# Patient Record
Sex: FEMALE | Race: WHITE | NOT HISPANIC OR LATINO | Employment: FULL TIME | URBAN - METROPOLITAN AREA
[De-identification: names, ages, dates, MRNs, and addresses within clinical notes are randomized per-mention and may not be internally consistent; named-entity substitution may affect disease eponyms.]

---

## 2020-02-06 ENCOUNTER — TRANSCRIBE ORDERS (OUTPATIENT)
Dept: ADMINISTRATIVE | Facility: HOSPITAL | Age: 60
End: 2020-02-06

## 2020-02-06 DIAGNOSIS — Z12.39 SCREENING BREAST EXAMINATION: Primary | ICD-10-CM

## 2020-02-06 DIAGNOSIS — R10.33 PERIUMBILICAL ABDOMINAL PAIN: ICD-10-CM

## 2020-02-12 ENCOUNTER — HOSPITAL ENCOUNTER (OUTPATIENT)
Dept: CT IMAGING | Facility: HOSPITAL | Age: 60
Discharge: HOME | End: 2020-02-12

## 2020-02-12 ENCOUNTER — HOSPITAL ENCOUNTER (OUTPATIENT)
Dept: MAMMOGRAPHY | Facility: HOSPITAL | Age: 60
Discharge: HOME | End: 2020-02-12
Admitting: FAMILY MEDICINE

## 2020-02-12 DIAGNOSIS — R10.33 PERIUMBILICAL ABDOMINAL PAIN: ICD-10-CM

## 2020-02-12 DIAGNOSIS — Z12.39 SCREENING BREAST EXAMINATION: ICD-10-CM

## 2020-02-12 LAB — CREAT BLDA-MCNC: 0.7 MG/DL (ref 0.6–1.3)

## 2020-02-12 PROCEDURE — 82565 ASSAY OF CREATININE: CPT

## 2020-02-12 PROCEDURE — 77067 SCR MAMMO BI INCL CAD: CPT

## 2020-02-12 PROCEDURE — 0 IOPAMIDOL PER 1 ML: Performed by: FAMILY MEDICINE

## 2020-02-12 PROCEDURE — 77063 BREAST TOMOSYNTHESIS BI: CPT

## 2020-02-12 PROCEDURE — 74177 CT ABD & PELVIS W/CONTRAST: CPT

## 2020-02-12 RX ADMIN — IOPAMIDOL 100 ML: 755 INJECTION, SOLUTION INTRAVENOUS at 10:45

## 2020-02-21 ENCOUNTER — OFFICE VISIT (OUTPATIENT)
Dept: SURGERY | Facility: CLINIC | Age: 60
End: 2020-02-21

## 2020-02-21 VITALS
OXYGEN SATURATION: 100 % | HEIGHT: 63 IN | WEIGHT: 259 LBS | SYSTOLIC BLOOD PRESSURE: 122 MMHG | TEMPERATURE: 97 F | HEART RATE: 81 BPM | BODY MASS INDEX: 45.89 KG/M2 | DIASTOLIC BLOOD PRESSURE: 73 MMHG

## 2020-02-21 DIAGNOSIS — K42.9 UMBILICAL HERNIA WITHOUT OBSTRUCTION AND WITHOUT GANGRENE: Primary | ICD-10-CM

## 2020-02-21 PROBLEM — K43.9 HERNIA OF ABDOMINAL WALL: Status: ACTIVE | Noted: 2020-02-21

## 2020-02-21 PROCEDURE — 99203 OFFICE O/P NEW LOW 30 MIN: CPT | Performed by: SURGERY

## 2020-02-21 NOTE — PROGRESS NOTES
"Alayna Garcia is a 59 y.o. female.   Chief Complaint   Patient presents with   • New patient     Periumbilical hernia      /73   Pulse 81   Temp 97 °F (36.1 °C) (Oral)   Ht 160 cm (63\")   Wt 117 kg (259 lb)   SpO2 100%   BMI 45.88 kg/m²     HISTORY OF PRESENT ILLNESS:  59-year-old lady referred to me for evaluation of abdominal pain.  She says that it began about 1 year ago when she was using a weight machine to do crunches.  She had supraumbilical pain that feels like twisting.  It is worse when she bends.  It is sharp it is not associate with any significant nausea vomiting or bowel changes.  The discomfort lasts only a few minutes.  In order to evaluate this she underwent a CT scan which showed a small periumbilical hernia containing fat.      No outpatient encounter medications on file as of 2/21/2020.     No facility-administered encounter medications on file as of 2/21/2020.          The following portions of the patient's history were reviewed and updated as appropriate: allergies, current medications, past family history, past medical history, past social history, past surgical history and problem list.    Review of Systems  A complete review of systems been obtained is positive for varicose veins and hot flashes the remainder of the review of systems is negative  Objective   Physical Exam   Constitutional: She appears well-developed and well-nourished.   HENT:   Head: Normocephalic and atraumatic.   Eyes: Conjunctivae are normal. No scleral icterus.   Neck: No tracheal deviation present.   Cardiovascular: Normal rate and intact distal pulses.   Pulmonary/Chest: Effort normal. No respiratory distress.   Abdominal: Soft. She exhibits no distension.   Tender to palpation in the upper midline where there is a rectus diastases but no significant hernia.  She is not tender to palpation at her umbilical hernia.   Lymphadenopathy:     She has no cervical adenopathy.   Neurological: She is " alert. No cranial nerve deficit.   Skin: Skin is warm and dry.   Psychiatric: She has a normal mood and affect. Her behavior is normal.         Assessment/Plan   Kelly was seen today for new patient.    Diagnoses and all orders for this visit:    Umbilical hernia without obstruction and without gangrene    The patient is symptomatic in the upper midline which I do not see any evidence of ventral hernia.  It sounds like hernia type pain but whenever her umbilical hernia is palpated it does not reproduce this discomfort.  She does have a BMI that is 46 which is above my ceiling for typically offering elective hernia repairs and so I have counseled her about the difference between the umbilical hernia and the rectus diastases and that although it is possible the umbilical hernia is causing her pain it may not be.  Because of her weight I have encouraged her to try to lose a little bit more weight in order to try to help reduce the risk if she is good to have an umbilical hernia repair performed.  At this time I will have her follow-up with me in about 4 months to reevaluate symptoms to reevaluate the abdominal wall physical exam findings and to check her weight loss status.    Nikolas Garza MD  2/21/2020  2:14 PM    This note was created using Dragon Voice Recognition software.

## 2020-02-25 ENCOUNTER — HOSPITAL ENCOUNTER (OUTPATIENT)
Facility: HOSPITAL | Age: 60
Setting detail: HOSPITAL OUTPATIENT SURGERY
End: 2020-02-25
Attending: INTERNAL MEDICINE | Admitting: INTERNAL MEDICINE

## 2020-04-01 ENCOUNTER — HOSPITAL ENCOUNTER (OUTPATIENT)
Facility: HOSPITAL | Age: 60
Setting detail: HOSPITAL OUTPATIENT SURGERY
End: 2020-04-01
Attending: INTERNAL MEDICINE | Admitting: INTERNAL MEDICINE

## 2020-07-07 RX ORDER — SODIUM PHOSPHATE,MONO-DIBASIC 19G-7G/118
1 ENEMA (ML) RECTAL
COMMUNITY
End: 2021-05-25

## 2020-07-14 ENCOUNTER — LAB (OUTPATIENT)
Dept: LAB | Facility: HOSPITAL | Age: 60
End: 2020-07-14

## 2020-07-14 PROCEDURE — C9803 HOPD COVID-19 SPEC COLLECT: HCPCS

## 2020-07-14 PROCEDURE — U0002 COVID-19 LAB TEST NON-CDC: HCPCS

## 2020-07-14 PROCEDURE — U0004 COV-19 TEST NON-CDC HGH THRU: HCPCS

## 2020-07-15 ENCOUNTER — ANESTHESIA EVENT (OUTPATIENT)
Dept: GASTROENTEROLOGY | Facility: HOSPITAL | Age: 60
End: 2020-07-15

## 2020-07-15 LAB
REF LAB TEST METHOD: NORMAL
SARS-COV-2 RNA RESP QL NAA+PROBE: NOT DETECTED

## 2020-07-16 ENCOUNTER — ANESTHESIA (OUTPATIENT)
Dept: GASTROENTEROLOGY | Facility: HOSPITAL | Age: 60
End: 2020-07-16

## 2020-07-16 ENCOUNTER — ON CAMPUS - OUTPATIENT (OUTPATIENT)
Dept: URBAN - METROPOLITAN AREA HOSPITAL 85 | Facility: HOSPITAL | Age: 60
End: 2020-07-16
Payer: COMMERCIAL

## 2020-07-16 ENCOUNTER — HOSPITAL ENCOUNTER (OUTPATIENT)
Facility: HOSPITAL | Age: 60
Setting detail: HOSPITAL OUTPATIENT SURGERY
Discharge: HOME | End: 2020-07-16
Attending: INTERNAL MEDICINE | Admitting: INTERNAL MEDICINE

## 2020-07-16 VITALS
RESPIRATION RATE: 19 BRPM | WEIGHT: 270 LBS | OXYGEN SATURATION: 97 % | SYSTOLIC BLOOD PRESSURE: 113 MMHG | HEART RATE: 72 BPM | BODY MASS INDEX: 46.1 KG/M2 | HEIGHT: 64 IN | DIASTOLIC BLOOD PRESSURE: 73 MMHG

## 2020-07-16 DIAGNOSIS — D12.5 BENIGN NEOPLASM OF SIGMOID COLON: ICD-10-CM

## 2020-07-16 DIAGNOSIS — D12.2 BENIGN NEOPLASM OF ASCENDING COLON: ICD-10-CM

## 2020-07-16 DIAGNOSIS — Z80.0 FAMILY HISTORY OF COLON CANCER REQUIRING SCREENING COLONOSCOPY: ICD-10-CM

## 2020-07-16 DIAGNOSIS — Z12.11 ENCOUNTER FOR SCREENING FOR MALIGNANT NEOPLASM OF COLON: ICD-10-CM

## 2020-07-16 PROCEDURE — 25010000002 PROPOFOL 10 MG/ML EMULSION: Performed by: ANESTHESIOLOGY

## 2020-07-16 PROCEDURE — 88305 TISSUE EXAM BY PATHOLOGIST: CPT | Performed by: INTERNAL MEDICINE

## 2020-07-16 PROCEDURE — 45385 COLONOSCOPY W/LESION REMOVAL: CPT | Mod: 33 | Performed by: INTERNAL MEDICINE

## 2020-07-16 RX ORDER — SODIUM CHLORIDE 0.9 % (FLUSH) 0.9 %
10 SYRINGE (ML) INJECTION EVERY 12 HOURS SCHEDULED
Status: DISCONTINUED | OUTPATIENT
Start: 2020-07-16 | End: 2020-07-16 | Stop reason: HOSPADM

## 2020-07-16 RX ORDER — PROPOFOL 10 MG/ML
VIAL (ML) INTRAVENOUS AS NEEDED
Status: DISCONTINUED | OUTPATIENT
Start: 2020-07-16 | End: 2020-07-16 | Stop reason: SURG

## 2020-07-16 RX ORDER — ONDANSETRON 2 MG/ML
4 INJECTION INTRAMUSCULAR; INTRAVENOUS ONCE AS NEEDED
Status: DISCONTINUED | OUTPATIENT
Start: 2020-07-16 | End: 2020-07-16 | Stop reason: HOSPADM

## 2020-07-16 RX ORDER — SODIUM CHLORIDE 9 MG/ML
9 INJECTION, SOLUTION INTRAVENOUS CONTINUOUS PRN
Status: DISCONTINUED | OUTPATIENT
Start: 2020-07-16 | End: 2020-07-16 | Stop reason: HOSPADM

## 2020-07-16 RX ORDER — LIDOCAINE HYDROCHLORIDE 10 MG/ML
0.5 INJECTION, SOLUTION EPIDURAL; INFILTRATION; INTRACAUDAL; PERINEURAL ONCE AS NEEDED
Status: DISCONTINUED | OUTPATIENT
Start: 2020-07-16 | End: 2020-07-16 | Stop reason: HOSPADM

## 2020-07-16 RX ORDER — SODIUM CHLORIDE 0.9 % (FLUSH) 0.9 %
10 SYRINGE (ML) INJECTION AS NEEDED
Status: DISCONTINUED | OUTPATIENT
Start: 2020-07-16 | End: 2020-07-16 | Stop reason: HOSPADM

## 2020-07-16 RX ORDER — LIDOCAINE HYDROCHLORIDE 20 MG/ML
INJECTION, SOLUTION INFILTRATION; PERINEURAL AS NEEDED
Status: DISCONTINUED | OUTPATIENT
Start: 2020-07-16 | End: 2020-07-16 | Stop reason: SURG

## 2020-07-16 RX ADMIN — LIDOCAINE HYDROCHLORIDE 100 MG: 20 INJECTION, SOLUTION INFILTRATION; PERINEURAL at 10:05

## 2020-07-16 RX ADMIN — PROPOFOL 50 MG: 10 INJECTION, EMULSION INTRAVENOUS at 10:18

## 2020-07-16 RX ADMIN — PROPOFOL 300 MG: 10 INJECTION, EMULSION INTRAVENOUS at 10:03

## 2020-07-16 NOTE — H&P
GI CONSULT  NOTE:    Referring Provider:    Loida Valente MD  [unfilled]    Chief complaint: Screening for colon cancer    History of present illness:      Kelly Garcia is a 60 y.o. female who presents today for Procedure(s):  COLONOSCOPY for the indications listed below.     The updated Patient Profile was reviewed prior to the procedure, in conjunction with the Physical Exam, including medical conditions, surgical procedures, medications, allergies, family history and social history.     Pre-operatively, I reviewed the indication(s) for the procedure, the risks of the procedure [including but not limited to: unexpected bleeding possibly requiring hospitalization and/or unplanned repeat procedures, perforation possibly requiring surgical treatment, missed lesions and complications of sedation/MAC (also explained by anesthesia staff)].     I have evaluated the patient for risks associated with the planned anesthesia and the procedure to be performed and find the patient an acceptable candidate for IV sedation.    Multiple opportunities were provided for any questions or concerns, and all questions were answered satisfactorily before any anesthesia was administered. We will proceed with the planned procedure.    Past Medical History:  Past Medical History:   Diagnosis Date   • Family history of colon cancer    • Umbilical hernia    • Varicose vein of leg        Past Surgical History:  Past Surgical History:   Procedure Laterality Date   • BREAST BIOPSY     •  SECTION      x2   • CHOLECYSTECTOMY     • ERCP     • HAND SURGERY      partial amputation of right index finger and tendon repair left hand       Social History:  Social History     Tobacco Use   • Smoking status: Never Smoker   • Smokeless tobacco: Never Used   Substance Use Topics   • Alcohol use: Yes     Comment: socially   • Drug use: Never       Family History:  Family History   Problem Relation Age of Onset   • Colon cancer Father    •  "Colon cancer Brother    • Breast cancer Other        Medications:  No medications prior to admission.       Scheduled Meds:  Continuous Infusions:  No current facility-administered medications for this encounter.   PRN Meds:.    ALLERGIES:  Penicillins and Morphine    ROS:  The following systems were reviewed and negative;   Constitution:  No fevers, chills, no unintentional weight loss  Skin: no rash, no jaundice  Eyes:  No blurry vision, no eye pain  HENT:  No change in hearing or smell  Resp:  No dyspnea or cough  CV:  No chest pain or palpitations  :  No dysuria, hematuria  Musculoskeletal:  No leg cramps or arthralgias  Neuro:  No tremor, no numbness  Psych:  No depression or confsuion    Objective     Vital Signs:   Vitals:    07/07/20 1142   Weight: 122 kg (270 lb)   Height: 162.6 cm (64\")       Physical Exam:       General Appearance:    Awake and alert, in no acute distress   Head:    Normocephalic, without obvious abnormality, atraumatic   Throat:   No oral lesions, no thrush, oral mucosa moist   Lungs:     respirations regular, even and unlabored   Skin:   No rash, no jaundice       Results Review:  Lab Results (last 24 hours)     ** No results found for the last 24 hours. **          Imaging Results (Last 24 Hours)     ** No results found for the last 24 hours. **           I reviewed the patient's labs and imaging.    ASSESSMENT AND PLAN:      Principal Problem:    Screening for colon cancer       Procedure(s):  COLONOSCOPY      I discussed the patients findings and my recommendations with the patient.    Sen Chirinos MD  07/16/20  06:47            "

## 2020-07-16 NOTE — ANESTHESIA PREPROCEDURE EVALUATION
Anesthesia Evaluation     Patient summary reviewed and Nursing notes reviewed   no history of anesthetic complications:  NPO Solid Status: > 8 hours  NPO Liquid Status: > 2 hours           Airway   Mallampati: III  TM distance: >3 FB  Neck ROM: full  No difficulty expected and Possible difficult intubation  Dental - normal exam     Pulmonary - negative pulmonary ROS and normal exam   Cardiovascular - negative cardio ROS and normal exam        Neuro/Psych- negative ROS  GI/Hepatic/Renal/Endo    (+) obesity, morbid obesity,      Musculoskeletal (-) negative ROS    Abdominal   (+) obese,    Substance History - negative use     OB/GYN negative ob/gyn ROS         Other - negative ROS                       Anesthesia Plan    ASA 3     MAC     intravenous induction     Anesthetic plan, all risks, benefits, and alternatives have been provided, discussed and informed consent has been obtained with: patient.

## 2020-07-16 NOTE — DISCHARGE INSTRUCTIONS
Recommendations:  Follow-up in pathology  Repeat colonoscopy in 3 years  consider genetic testing for Stephens syndrome  High-fiber diet    A responsible adult should stay with you and you should rest quietly for the rest of the day.    Do not drink alcohol, drive, operate any heavy machinery or power tools or make any legal/important decisions for the next 24 hours.     Progress your diet as tolerated.  If you begin to experience severe pain, increased shortness of breath, racing heartbeat or a fever above 101 F, seek immediate medical attention.     Follow up with MD as instructed. Call office for results in 3 to 5 days if needed.

## 2020-07-16 NOTE — OP NOTE
COLONOSCOPY Procedure Report    Patient Name:  Kelly Garcia  YOB: 1960    Date of Surgery:  7/16/2020     Pre-Op Diagnosis:  Screening for colon cancer  Family history of colon cancer in brother and father    Post-Op Diagnosis:  Colon polyps  Grade 2 internal and external hemorrhoids    Procedure/CPT® Codes:  Colonoscopy with snare polypectomy    Staff:  Surgeon(s):  Sen Chirinos MD         Anesthesia: Monitored Anesthesia Care    Implants:    Nothing was implanted during the procedure    Specimen:        See below    Complications:  None    Description of Procedure:  Informed consent was obtained for the procedure, including sedation.  Risks of perforation, hemorrhage, adverse drug reaction and aspiration were discussed.  The patient was brought into the endoscopy suite. Continuous cardiopulmonary monitoring was performed.  The patient was placed in the left lateral decubitus position. After adequate sedation was attained, the digital rectal exam was performed which showed external hemorrhoids.  Subsequently, the Olympus colonoscope was inserted into the patient's rectum and advanced to the level of the cecum and terminal ileum without difficulty.  The bowel prep was excellent.  Circumferential examination of the patient's colon was performed on scope withdrawal.  The cecum, ascending colon, and hepatic flexure were examined twice.  The transverse colon, splenic flexure, descending, sigmoid colon, and rectum were examined.  A retroflex exam was performed in the rectum.  The bowel was decompressed, the scope was withdrawn from the patient, and the patient tolerated the procedure well. There were no immediate post-operative complications.     Findings:   Terminal ileum: Normal mucosa in the distal 10 cm  Colon: 2 small, sessile polyps in ascending colon ranging in size from 2 to 4 mm removed with cold snare single piece polypectomies retrieved.  2, diminutive 3 to 4 mm sessile polyps in  sigmoid colon removed with cold snare single piece polypectomies retrieved.  Grade 2 internal hemorrhoids on retroflexed view in rectum and external hemorrhoids noted on digital exam.    Impression:  60-year-old female with strong family history of colon cancer presenting for her first screening colonoscopy.  4 small polyps were removed and internal hemorrhoids were noted.    Recommendations:  Follow-up in pathology  Repeat colonoscopy in 3 years  consider genetic testing for Stephens syndrome  High-fiber diet    We appreciate the referral    Sen Chirinos MD     Date: 7/16/2020  Time: 10:30

## 2020-07-16 NOTE — ANESTHESIA POSTPROCEDURE EVALUATION
Patient: Kelly Garcia    Procedure Summary     Date:  07/16/20 Room / Location:  [facility] ENDOSCOPY 1 / [facility] ENDOSCOPY    Anesthesia Start:  1003 Anesthesia Stop:  1028    Procedure:  COLONOSCOPY (N/A ) Diagnosis:       Family history of colon cancer requiring screening colonoscopy      (Family history of colon cancer requiring screening colonoscopy [Z80.0])    Surgeon:  Sen Chirinos MD Provider:  Marva Doyle MD    Anesthesia Type:  MAC ASA Status:  3          Anesthesia Type: MAC    Vitals  No vitals data found for the desired time range.          Post Anesthesia Care and Evaluation    Patient location during evaluation: PACU  Patient participation: complete - patient participated  Level of consciousness: awake  Pain score: 0  Pain management: adequate  Airway patency: patent  Anesthetic complications: No anesthetic complications  PONV Status: none  Cardiovascular status: acceptable  Respiratory status: acceptable  Hydration status: acceptable

## 2020-07-17 LAB
LAB AP CASE REPORT: NORMAL
PATH REPORT.FINAL DX SPEC: NORMAL
PATH REPORT.GROSS SPEC: NORMAL

## 2021-02-12 ENCOUNTER — TRANSCRIBE ORDERS (OUTPATIENT)
Dept: ADMINISTRATIVE | Facility: HOSPITAL | Age: 61
End: 2021-02-12

## 2021-02-12 DIAGNOSIS — Z12.31 SCREENING MAMMOGRAM, ENCOUNTER FOR: Primary | ICD-10-CM

## 2021-02-25 ENCOUNTER — APPOINTMENT (OUTPATIENT)
Dept: MAMMOGRAPHY | Facility: HOSPITAL | Age: 61
End: 2021-02-25

## 2021-02-25 ENCOUNTER — HOSPITAL ENCOUNTER (OUTPATIENT)
Dept: MAMMOGRAPHY | Facility: HOSPITAL | Age: 61
Discharge: HOME | End: 2021-02-25
Admitting: FAMILY MEDICINE

## 2021-02-25 DIAGNOSIS — Z12.31 SCREENING MAMMOGRAM, ENCOUNTER FOR: ICD-10-CM

## 2021-02-25 PROCEDURE — 77063 BREAST TOMOSYNTHESIS BI: CPT

## 2021-02-25 PROCEDURE — 77067 SCR MAMMO BI INCL CAD: CPT

## 2021-04-27 ENCOUNTER — TELEPHONE (OUTPATIENT)
Dept: ONCOLOGY | Facility: CLINIC | Age: 61
End: 2021-04-27

## 2021-04-27 NOTE — TELEPHONE ENCOUNTER
Caller: AMARI     Relationship to patient: SELF     Best call back number: [phone]    PATIENT STATED SHE JUST RECEIVED CALL AND DIDN'T ANSWER. SHE BELIEVES IT WAS SCHEDULING FOR A REFERRAL   PLEASE CALL AND ADVISE   THANK YOU

## 2021-05-25 ENCOUNTER — APPOINTMENT (OUTPATIENT)
Dept: LAB | Facility: HOSPITAL | Age: 61
End: 2021-05-25

## 2021-05-25 ENCOUNTER — CONSULT (OUTPATIENT)
Dept: ONCOLOGY | Facility: CLINIC | Age: 61
End: 2021-05-25

## 2021-05-25 VITALS
HEIGHT: 64 IN | RESPIRATION RATE: 16 BRPM | HEART RATE: 76 BPM | BODY MASS INDEX: 44.66 KG/M2 | SYSTOLIC BLOOD PRESSURE: 138 MMHG | WEIGHT: 261.6 LBS | TEMPERATURE: 97.8 F | DIASTOLIC BLOOD PRESSURE: 83 MMHG

## 2021-05-25 DIAGNOSIS — Z80.9 FAMILY HISTORY OF CANCER: Primary | ICD-10-CM

## 2021-05-25 PROCEDURE — 99205 OFFICE O/P NEW HI 60 MIN: CPT | Performed by: INTERNAL MEDICINE

## 2021-05-25 PROCEDURE — 36415 COLL VENOUS BLD VENIPUNCTURE: CPT | Performed by: INTERNAL MEDICINE

## 2021-05-25 NOTE — PROGRESS NOTES
Hematology/Oncology Outpatient Consultation    Patient name: Kelly Garcia  : 1960  MRN: [id number]  Primary Care Physician: Loida Valente MD  Referring Physician: Loida Valente MD  Reason For Consult:     No chief complaint on file.      History of Present Illness:    Past Medical History:   Diagnosis Date   • Family history of colon cancer    • Umbilical hernia    • Varicose vein of leg        Past Surgical History:   Procedure Laterality Date   • BREAST BIOPSY     •  SECTION      x2   • CHOLECYSTECTOMY     • COLONOSCOPY N/A 2020    Procedure: COLONOSCOPY WITH POLYPECTOMY X'S 4;  Surgeon: Sen Chirinos MD;  Location: [facility] ENDOSCOPY;  Service: Gastroenterology;  Laterality: N/A;  POLYPS, INTERNAL AND EXTERNAL HEMORRHOIDS   • ERCP     • HAND SURGERY      partial amputation of right index finger and tendon repair left hand         Current Outpatient Medications:   •  Diclofenac Sodium (VOLTAREN) 1 % gel gel, Apply 2 g topically to the appropriate area as directed 4 (Four) Times a Day., Disp: 100 g, Rfl: 12  •  Diclofenac Sodium (VOLTAREN) 1 % gel gel, apply 2 gram by topical route 4 times every day to the affected area(s), Disp: 100 g, Rfl: 12  •  diclofenac sodium (VOTAREN XR) 100 MG 24 hr tablet, Take 1 tablet by mouth Daily., Disp: 30 tablet, Rfl: 12  •  diclofenac sodium (VOTAREN XR) 100 MG 24 hr tablet, Take 1 tablet by mouth Daily., Disp: 30 tablet, Rfl: 12  •  glucosamine sulfate 500 MG capsule capsule, Take 1 capsule by mouth 3 (Three) Times a Day With Meals., Disp: , Rfl:   •  Multiple Vitamins-Minerals (MULTIVITAMIN ADULT PO), Take 1 tablet by mouth Daily., Disp: , Rfl:   •  omeprazole (priLOSEC) 40 MG capsule, Take 1 capsule by mouth Daily before a meal., Disp: 30 capsule, Rfl: 12  •  omeprazole (priLOSEC) 40 MG capsule, Take 1 capsule by mouth Daily before a meal., Disp: 30 capsule, Rfl: 12    Allergies   Allergen Reactions   • Penicillins Nausea And Vomiting and  Shortness Of Breath   • Morphine Nausea And Vomiting and Hallucinations         There is no immunization history on file for this patient.    Family History   Problem Relation Age of Onset   • Colon cancer Father    • Colon cancer Brother    • Breast cancer Other        Cancer-related family history includes Breast cancer in an other family member; Colon cancer in her brother and father.    Social History     Tobacco Use   • Smoking status: Never Smoker   • Smokeless tobacco: Never Used   Substance Use Topics   • Alcohol use: Yes     Comment: socially   • Drug use: Never       ROS:    Review of Systems    Objective:    There were no vitals filed for this visit.  There is no height or weight on file to calculate BMI.    ECOG  {[facility] ECOG Status:18927}    Physical Exam:  Physical Exam    RECENT LABS  No results found for: WBC, RBC, HGB, HCT, MCV, MCH, MCHC, RDW, RDWSD, MPV, PLT, NEUTRORELPCT, LYMPHORELPCT, MONORELPCT, EOSRELPCT, BASORELPCT, AUTOIGPER, NEUTROABS, LYMPHSABS, MONOSABS, EOSABS, BASOSABS, AUTOIGNUM, NRBC    Lab Results   Component Value Date    CREATININE 0.70 02/12/2020         Assessment/Plan   There are no diagnoses linked to this encounter.    1.       I have reviewed labs results, imaging, vitals, and medications with the patient today.  Will follow up in *** months with ***.    I counselled Kelly of the risks of continuing to use tobacco and cessation.    During this visit, I spent 3-10 minutes counseling the patient regarding tobacco cessation.     Patient verbalized understanding and is in agreement of the above plan.                     Principal Discharge DX:	Acute urinary retention  Secondary Diagnosis:	Anemia

## 2021-05-25 NOTE — PROGRESS NOTES
Genetic Note    Kelly Garcia  1960    Primary Care Physician: Loida Valente MD  Referring Physician: Loida Valente MD  Reason For Visit: High Risk Evaluation For Cancer    Chief Complaint   Patient presents with   • Appointment     Breast cancer screening       HPI     This is a 60-year-old female who is here today due to strong family history of multiple malignancies and several family members.  Review of her family cancer history indicates her father had colon cancer at the age of 71, her brother had renal cancer in his 50s, has other brother had colon cancer as well in his 50s.  Patient sister at the age of 62 developed bilateral breast cancers and has undergone bilateral mastectomies.  Her paternal cousin also had breast cancer, age unclear.  Paternal uncle had multiple myeloma in his 60s, another paternal uncle also had throat cancer.    On the maternal side, there is a maternal aunt with leukemia in her 50s and a maternal uncle with lung cancer in his 60s.  The maternal uncle with lung cancer also was exposed to asbestos.  This may have played a role in his cancer development.  None of the family members affected by malignancy have had genetic testing.  Patient is interested in pursuing cancer genetics for her risk management.    Patient is postmenopausal.  She has a history of Blahnik polyps.  Her last colonoscopy was done in  by Dr. Dowell.  She was asked to come back in 3 years.      Past Medical History:   Diagnosis Date   • Family history of colon cancer    • Umbilical hernia    • Varicose vein of leg        Past Surgical History:   Procedure Laterality Date   • BREAST BIOPSY     •  SECTION      x2   • CHOLECYSTECTOMY     • COLONOSCOPY N/A 2020    Procedure: COLONOSCOPY WITH POLYPECTOMY X'S 4;  Surgeon: Sen Chirinos MD;  Location: [facility] ENDOSCOPY;  Service: Gastroenterology;  Laterality: N/A;  POLYPS, INTERNAL AND EXTERNAL HEMORRHOIDS   • ERCP     • HAND  SURGERY      partial amputation of right index finger and tendon repair left hand         Current Outpatient Medications:   •  Diclofenac Sodium (VOLTAREN) 1 % gel gel, Apply 2 g topically to the appropriate area as directed 4 (Four) Times a Day., Disp: 100 g, Rfl: 12  •  Diclofenac Sodium (VOLTAREN) 1 % gel gel, apply 2 gram by topical route 4 times every day to the affected area(s), Disp: 100 g, Rfl: 12  •  diclofenac sodium (VOTAREN XR) 100 MG 24 hr tablet, Take 1 tablet by mouth Daily., Disp: 30 tablet, Rfl: 12  •  diclofenac sodium (VOTAREN XR) 100 MG 24 hr tablet, Take 1 tablet by mouth Daily., Disp: 30 tablet, Rfl: 12  •  omeprazole (priLOSEC) 40 MG capsule, Take 1 capsule by mouth Daily before a meal., Disp: 30 capsule, Rfl: 12    Allergies   Allergen Reactions   • Penicillins Nausea And Vomiting and Shortness Of Breath   • Morphine Nausea And Vomiting and Hallucinations       Family History   Problem Relation Age of Onset   • Colon cancer Father    • Colon cancer Brother    • Breast cancer Other    • Breast cancer Sister    • Leukemia Maternal Aunt    • Lung cancer Maternal Uncle    • Cancer Brother         Renal   • Multiple myeloma Paternal Uncle    • Throat cancer Paternal Uncle        Cancer-related family history includes Breast cancer in her sister and another family member; Cancer in her brother; Colon cancer in her brother and father; Lung cancer in her maternal uncle; Throat cancer in her paternal uncle.    Social History     Tobacco Use   • Smoking status: Never Smoker   • Smokeless tobacco: Never Used   Substance Use Topics   • Alcohol use: Yes     Comment: 2-3 drinks/week   • Drug use: Never       Review of Systems   Constitutional: Negative for chills and fever.   HENT: Negative for ear pain, mouth sores, nosebleeds and sore throat.    Eyes: Negative for photophobia and visual disturbance.   Respiratory: Negative for wheezing and stridor.    Cardiovascular: Negative for chest pain and  "palpitations.   Gastrointestinal: Negative for abdominal pain, diarrhea, nausea and vomiting.   Endocrine: Negative for cold intolerance and heat intolerance.   Genitourinary: Negative for dysuria and hematuria.   Musculoskeletal: Negative for joint swelling and neck stiffness.   Skin: Negative for color change and rash.   Neurological: Negative for seizures and syncope.   Hematological: Negative for adenopathy.        No obvious bleeding   Psychiatric/Behavioral: Negative for agitation, confusion and hallucinations.       Objective:    Vitals:    05/25/21 1433   BP: 138/83   Pulse: 76   Resp: 16   Temp: 97.8 °F (36.6 °C)   Weight: 119 kg (261 lb 9.6 oz)   Height: 162.6 cm (64\")   PainSc: 0-No pain       (0) Fully active, able to carry on all predisease performance without restriction    Physical Exam  Vitals and nursing note reviewed.   Constitutional:       General: She is not in acute distress.     Appearance: She is not diaphoretic.   HENT:      Head: Normocephalic and atraumatic.   Eyes:      General: No scleral icterus.        Right eye: No discharge.         Left eye: No discharge.      Conjunctiva/sclera: Conjunctivae normal.   Neck:      Thyroid: No thyromegaly.   Cardiovascular:      Rate and Rhythm: Normal rate and regular rhythm.      Heart sounds: Normal heart sounds. No friction rub. No gallop.    Pulmonary:      Effort: Pulmonary effort is normal. No respiratory distress.      Breath sounds: No stridor. No wheezing.   Abdominal:      General: Bowel sounds are normal.      Palpations: Abdomen is soft. There is no mass.      Tenderness: There is no abdominal tenderness. There is no guarding or rebound.   Musculoskeletal:         General: No tenderness. Normal range of motion.      Cervical back: Normal range of motion and neck supple.   Lymphadenopathy:      Cervical: No cervical adenopathy.   Skin:     General: Skin is warm.      Findings: No erythema or rash.   Neurological:      Mental Status: She is " alert and oriented to person, place, and time.      Motor: No abnormal muscle tone.   Psychiatric:         Behavior: Behavior normal.         Assessment/Plan     Family history of cancer  - Cancer Next Expanded - Miscellaneous Test  - Cancer Next Expanded - Miscellaneous Test      1. Strong family history of cancer including colon cancer in her father and brother, brother had renal cancer and a sister with bilateral breast cancer.  There is also a paternal cousin with breast cancer.  There is concern for possible hereditary cancer syndrome.  Patient is interested in pursuing cancer genetics for her own risk management.  None of the family members affected by malignancy have had genetic testing.          Discussion    She has strong family history of multiple malignancies including colon, renal, breast cancer in several members.  Review of the cancer diagnosis was suggest syndrome such as seen with Stephens mutations.  None of the family members affected by malignancy had genetic testing.  Patient is interested in pursuing cancer genetics.  She does meet the family cancer history criteria to have genetic testing performed.  Patient's father is .    Today's risk assessment is based on the history the patient has provided.  We discussed that the best people to screen are the ones who have been affected by malignancy as their result is more informative.  We reviewed all the hallmarks of hereditary cancer syndrome including multiple relatives on the same side of the family being affected by malignancy, cancer diagnosis in young individuals, different cancers in one individual.      We discussed the implications of a positive deleterious mutation which can result in recommendations for prophylactic surgeries, chemoprevention, lifestyle modifications and aggressive screening with mammogram and MRI, and also increased breast awareness and breast self exams.  Patient understands if she screens positive, then at-risk  family members will need to be screened as well.  Each offspring has a 50% chance of inheriting a deleterious mutation if positive in a parent.      A negative deleterious mutation will make hereditary cancer syndrome much less likely, but does not rule out the possibility of a gene mutation that cannot be detected with the available technology.  She also understands that a negative gene test result might indicate that the cancers that occurred in her family were most likely sporadic, or even if there is a mutation the patient did not inherit it.     We discussed variant of unknown significance.  Patient understands that no medical decisions will be made based on a variant of unknown significance, but I did stress the importance of maintaining contact information with us should this be the case.      We discussed the financial implications of the above testing.  Patient understands when the right clinical criteria are met that most insurance companies will cover the cost.      We also discussed the various insurability issues with a deleterious mutation result.     Patient has give us consent to proceed with comprehensive genetic analysis.          PLANS:      · Comprehensive gene analysis with SNS technology  · Follow-up in 6 weeks to review results and to make further recommendations         Thank you very much allowing me to participate in the care of Kelly, I will keep you updated on her progress          I spent 60 total minutes, face-to-face, caring for Kelly today.  80% of this time involved counseling and/or coordination of care as documented within this note, reviewing family history data, counseling and documented all pertinents

## 2021-06-15 ENCOUNTER — TELEPHONE (OUTPATIENT)
Dept: ONCOLOGY | Facility: CLINIC | Age: 61
End: 2021-06-15

## 2021-06-15 NOTE — TELEPHONE ENCOUNTER
Caller: Kelly Garcia    Relationship to patient: Self    Best call back number: [phone]    Chief complaint: CANC. APPT. & TO LET DR. MOYER THAT INS. DENIED GENETIC TESTING SO SHE FEELS THERE IS NO REASON TO COME FOR A FOLLOW UP APPT.    Type of visit:  LAB/FOLLOW UP 1    Requested date: NONE    When is the original appointment: 7/7/2021    Additional notes: PLEASE PASS ON TO LANE. DESK.

## 2021-09-21 ENCOUNTER — TELEMEDICINE (OUTPATIENT)
Dept: FAMILY MEDICINE CLINIC | Facility: TELEHEALTH | Age: 61
End: 2021-09-21

## 2021-09-21 DIAGNOSIS — R50.9 LOW GRADE FEVER: ICD-10-CM

## 2021-09-21 DIAGNOSIS — R19.7 DIARRHEA, UNSPECIFIED TYPE: Primary | ICD-10-CM

## 2021-09-21 PROCEDURE — BHEMPVIDEOVISIT: Performed by: NURSE PRACTITIONER

## 2021-09-21 NOTE — PATIENT INSTRUCTIONS
"Due to your symptoms I have ordered a COVID-19 test for you to rule this out. Please go to your nearest [facility] for COVID-19 testing. Call before you arrive and let them know you have an order. We will call you with the results from a BLOCKED NUMBER, usually within 1-3 days.     For [facility], [place], operated by [employer] Employee's undergoing COVID-19 testing: Have your COVID test done within 24-48 hours of failing the screening tool. Contact [place] at [phone] or [phone]. Leave a VM with your full name, employee ID, , exact details of symptoms or exposure. You will receive a call back within 24-72 hours with further info.     If your test is Negative: Complete the \"Return to Work Survey\" from [employer] (found on [employer]UB[employer]) to return to work.    If your test is Positive: Call [place] immediately to inform of the positive result. Self-Quarantine until you are deemed no longer contagious (usually 10 days from symptom onset). Complete the \"Return to Work Survey\" found on [employer]UB[employer] to report your Positive test, then return within 24 hours of your anticipated return date in order to seek official clearance to return to work.      Increase fluids, follow a BLAND diet and rest today.  If symptoms worsen or do not improve follow up with your PCP or visit your nearest Urgent Care Center or ER.          How to Quarantine at Home  Information for Patients and Families    These instructions are for people with confirmed or suspected COVID-19 who do not need to be hospitalized and those with confirmed COVID-19 who were hospitalized and discharged to care for themselves at home.    If you were tested through the Health Department  The Health Department will monitor your wellbeing.  If it is determined that you do not need to be hospitalized and can be isolated at home, you will be monitored by staff from your local or state health department.     If you were tested through a Commercial Lab  You will need to monitor " yourself and report changes in your symptoms to your doctor.  See the section below called Monitor Your Symptoms.    Follow these steps until a healthcare provider or local or state health department says you can return to your normal activities.    Stay home except to get medical care  • Restrict activities outside your home, except for getting medical care.   • Do not go to work, school, or public areas.   • Avoid using public transportation, ride-sharing, or taxis.    Separate yourself from other people and animals in your home  People  As much as possible, you should stay in a specific room and away from other people in your home. Also, you should use a separate bathroom, if available.    Animals  You should restrict contact with pets and other animals while you are sick with COVID-19, just like you would around other people. When possible, have another member of your household care for your animals while you are sick. If you are sick with COVID-19, avoid contact with your pet, including petting, snuggling, being kissed or licked, and sharing food. If you must care for your pet or be around animals while you are sick, wash your hands before and after you interact with pets and wear a facemask. See COVID-19 and Animals for more information.    Call ahead before visiting your doctor  If you have a medical appointment, call the healthcare provider and tell them that you have or may have COVID-19. This information will help the healthcare provider’s office take steps to keep other people from getting infected or exposed.    Wear a facemask  You should wear a facemask when you are around other people (e.g., sharing a room or vehicle) or pets and before you enter a healthcare provider’s office.     If you are not able to wear a facemask (for example, because it causes trouble breathing), then people who live with you should not stay in the same room with you, or they should wear a facemask if they enter your  room.    Cover your coughs and sneezes  • Cover your mouth and nose with a tissue when you cough or sneeze.   • Throw used tissues in a lined trash can.   • Immediately wash your hands with soap and water for at least 20 seconds or, if soap and water are not available, clean your hands with an alcohol-based hand  that contains at least 60% alcohol.    Clean your hands often  • Wash your hands often with soap and water for at least 20 seconds, especially after blowing your nose, coughing, or sneezing; going to the bathroom; and before eating or preparing food.     • If soap and water are not readily available, use an alcohol-based hand  with at least 60% alcohol, covering all surfaces of your hands and rubbing them together until they feel dry.    • Soap and water are the best option if hands are visibly dirty. Avoid touching your eyes, nose, and mouth with unwashed hands.    Avoid sharing personal household items  • You should not share dishes, drinking glasses, cups, eating utensils, towels, or bedding with other people or pets in your home.   • After using these items, they should be washed thoroughly with soap and water.    Clean all “high-touch” surfaces everyday  • High touch surfaces include counters, tabletops, doorknobs, bathroom fixtures, toilets, phones, keyboards, tablets, and bedside tables.   • Also, clean any surfaces that may have blood, stool, or body fluids on them.   • Use a household cleaning spray or wipe, according to the label instructions. Labels contain instructions for safe and effective use of the cleaning product, including precautions you should take when applying the product, such as wearing gloves and making sure you have good ventilation during use of the product.    Monitor your symptoms  • Seek prompt medical attention if your illness is worsening (e.g., difficulty breathing).   • Before seeking care, call your healthcare provider and tell them that you have, or are  being evaluated for, COVID-19.   • Put on a facemask before you enter the facility.     • These steps will help the healthcare provider’s office to keep other people in the office or waiting room from getting infected or exposed.   • Persons who are placed under active monitoring or facilitated self-monitoring should follow instructions provided by their local health department or occupational health professionals, as appropriate.  • If you have a medical emergency and need to call 911, notify the dispatch personnel that you have, or are being evaluated for COVID-19. If possible, put on a facemask before emergency medical services arrive.    Discontinuing home isolation  Patients with confirmed COVID-19 should remain under home isolation precautions until the risk of secondary transmission to others is thought to be low. The decision to discontinue home isolation precautions should be made on a case-by-case basis, in consultation with healthcare providers and state and local health departments.    The below content are for household members, intimate partners, and caregivers of a patient with symptomatic laboratory-confirmed COVID-19 or a patient under investigation:    Household members, intimate partners, and caregivers may have close contact with a person with symptomatic, laboratory-confirmed COVID-19 or a person under investigation.     Close contacts should monitor their health; they should call their healthcare provider right away if they develop symptoms suggestive of COVID-19 (e.g., fever, cough, shortness of breath)     Close contacts should also follow these recommendations:  • Make sure that you understand and can help the patient follow their healthcare provider’s instructions for medication(s) and care. You should help the patient with basic needs in the home and provide support for getting groceries, prescriptions, and other personal needs.  • Monitor the patient’s symptoms. If the patient is getting  sicker, call his or her healthcare provider and tell them that the patient has laboratory-confirmed COVID-19. This will help the healthcare provider’s office take steps to keep other people in the office or waiting room from getting infected. Ask the healthcare provider to call the local or state health department for additional guidance. If the patient has a medical emergency and you need to call 911, notify the dispatch personnel that the patient has, or is being evaluated for COVID-19.  • Household members should stay in another room or be  from the patient as much as possible. Household members should use a separate bedroom and bathroom, if available.  • Prohibit visitors who do not have an essential need to be in the home.  • Household members should care for any pets in the home. Do not handle pets or other animals while sick.  For more information, see COVID-19 and Animals.  • Make sure that shared spaces in the home have good air flow, such as by an air conditioner or an opened window, weather permitting.  • Perform hand hygiene frequently. Wash your hands often with soap and water for at least 20 seconds or use an alcohol-based hand  that contains 60 to 95% alcohol, covering all surfaces of your hands and rubbing them together until they feel dry. Soap and water should be used preferentially if hands are visibly dirty.  • Avoid touching your eyes, nose, and mouth with unwashed hands.  • The patient should wear a facemask when you are around other people. If the patient is not able to wear a facemask (for example, because it causes trouble breathing), you, as the caregiver, should wear a mask when you are in the same room as the patient.  • Wear a disposable facemask and gloves when you touch or have contact with the patient’s blood, stool, or body fluids, such as saliva, sputum, nasal mucus, vomit, or urine.   o Throw out disposable facemasks and gloves after using them. Do not  reuse.  o When removing personal protective equipment, first remove and dispose of gloves. Then, immediately clean your hands with soap and water or alcohol-based hand . Next, remove and dispose of facemask, and immediately clean your hands again with soap and water or alcohol-based hand .  • Avoid sharing household items with the patient. You should not share dishes, drinking glasses, cups, eating utensils, towels, bedding, or other items. After the patient uses these items, you should wash them thoroughly (see below “Wash laundry thoroughly”).  • Clean all “high-touch” surfaces, such as counters, tabletops, doorknobs, bathroom fixtures, toilets, phones, keyboards, tablets, and bedside tables, every day. Also, clean any surfaces that may have blood, stool, or body fluids on them.   o Use a household cleaning spray or wipe, according to the label instructions. Labels contain instructions for safe and effective use of the cleaning product including precautions you should take when applying the product, such as wearing gloves and making sure you have good ventilation during use of the product.  • Wash laundry thoroughly.   o Immediately remove and wash clothes or bedding that have blood, stool, or body fluids on them.  o Wear disposable gloves while handling soiled items and keep soiled items away from your body. Clean your hands (with soap and water or an alcohol-based hand ) immediately after removing your gloves.  o Read and follow directions on labels of laundry or clothing items and detergent. In general, using a normal laundry detergent according to washing machine instructions and dry thoroughly using the warmest temperatures recommended on the clothing label.  • Place all used disposable gloves, facemasks, and other contaminated items in a lined container before disposing of them with other household waste. Clean your hands (with soap and water or an alcohol-based hand )  immediately after handling these items. Soap and water should be used preferentially if hands are visibly dirty.  • Discuss any additional questions with your state or local health department or healthcare provider.    Adapted from information provided by the Centers for Disease Control and Prevention.  For more information, visit https://www.cdc.gov/coronavirus/2019-ncov/hcp/guidance-prevent-spread.html10 Things You Can Do to Manage Your COVID-19 Symptoms at Home  If you have possible or confirmed COVID-19:  1. Stay home except to get medical care.  2. Monitor your symptoms carefully. If your symptoms get worse, call your healthcare provider immediately.  3. Get rest and stay hydrated.  4. If you have a medical appointment, call the healthcare provider ahead of time and tell them that you have or may have COVID-19.  5. For medical emergencies, call 911 and notify the dispatch personnel that you have or may have COVID-19.  6. Cover your cough and sneezes with a tissue or use the inside of your elbow.  7. Wash your hands often with soap and water for at least 20 seconds or clean your hands with an alcohol-based hand  that contains at least 60% alcohol.  8. As much as possible, stay in a specific room and away from other people in your home. Also, you should use a separate bathroom, if available. If you need to be around other people in or outside of the home, wear a mask.  9. Avoid sharing personal items with other people in your household, like dishes, towels, and bedding.  10. Clean all surfaces that are touched often, like counters, tabletops, and doorknobs. Use household cleaning sprays or wipes according to the label instructions.  cdc.gov/coronavirus  07/16/2021  This information is not intended to replace advice given to you by your health care provider. Make sure you discuss any questions you have with your health care provider.  Document Revised: 08/04/2021 Document Reviewed: 08/04/2021  [employer]  Patient Education © 2021 [employer]  Fever, Adult         A fever is an increase in your body's temperature. It often means a temperature of 100.4°F (38°C) or higher. Brief mild or moderate fevers often have no long-term effects. They often do not need treatment. Moderate or high fevers may make you feel uncomfortable. Sometimes, they can be a sign of a serious illness or disease. A fever that keeps coming back or that lasts a long time may cause you to lose water in your body (get dehydrated).  You can take your temperature with a thermometer to see if you have a fever. Temperature can change with:  · Age.  · Time of day.  · Where the thermometer is put in the body. Readings may vary when the thermometer is put:  ? In the mouth (oral).  ? In the butt (rectal).  ? In the ear (tympanic).  ? Under the arm (axillary).  ? On the forehead (temporal).  Follow these instructions at home:  Medicines  · Take over-the-counter and prescription medicines only as told by your doctor. Follow the dosing instructions carefully.  · If you were prescribed an antibiotic medicine, take it as told by your doctor. Do not stop taking it even if you start to feel better.  General instructions  · Watch for any changes in your symptoms. Tell your doctor about them.  · Rest as needed.  · Drink enough fluid to keep your pee (urine) pale yellow.  · Sponge yourself or bathe with room-temperature water as needed. This helps to lower your body temperature. Do not use ice water.  · Do not use too many blankets or wear clothes that are too heavy.  · If your fever was caused by an infection that spreads from person to person (is contagious), such as a cold or the flu:  ? You should stay home from work and public places for at least 24 hours after your fever is gone.  ? Your fever should be gone for at least 24 hours without the need to use medicines.  Contact a doctor if:  · You throw up (vomit).  · You cannot eat or drink without throwing  up.  · You have watery poop (diarrhea).  · It hurts when you pee.  · Your symptoms do not get better with treatment.  · You have new symptoms.  · You feel very weak.  Get help right away if:  · You are short of breath or have trouble breathing.  · You are dizzy or you pass out (faint).  · You feel mixed up (confused).  · You have signs of not having enough water in your body, such as:  ? Dark pee, very little pee, or no pee.  ? Cracked lips.  ? Dry mouth.  ? Sunken eyes.  ? Sleepiness.  ? Weakness.  · You have very bad pain in your belly (abdomen).  · You keep throwing up or having watery poop.  · You have a rash on your skin.  · Your symptoms get worse all of a sudden.  Summary  · A fever is an increase in your body's temperature. It often means a temperature of 100.4°F (38°C) or higher.  · Watch for any changes in your symptoms. Tell your doctor about them.  · Take all medicines only as told by your doctor.  · Do not go to work or other public places if your fever was caused by an illness that can spread to other people.  · Get help right away if you have signs that you do not have enough water in your body.  This information is not intended to replace advice given to you by your health care provider. Make sure you discuss any questions you have with your health care provider.  Document Revised: 06/03/2019 Document Reviewed: 06/03/2019  [employer] Patient Education © 2021 [employer]  Diarrhea, Adult  Diarrhea is frequent loose and watery bowel movements. Diarrhea can make you feel weak and cause you to become dehydrated. Dehydration can make you tired and thirsty, cause you to have a dry mouth, and decrease how often you urinate.  Diarrhea typically lasts 2-3 days. However, it can last longer if it is a sign of something more serious. It is important to treat your diarrhea as told by your health care provider.  Follow these instructions at home:  Eating and drinking         Follow these recommendations as told by  your health care provider:  · Take an oral rehydration solution (ORS). This is an over-the-counter medicine that helps return your body to its normal balance of nutrients and water. It is found at pharmacies and retail stores.  · Drink plenty of fluids, such as water, ice chips, diluted fruit juice, and low-calorie sports drinks. You can drink milk also, if desired.  · Avoid drinking fluids that contain a lot of sugar or caffeine, such as energy drinks, sports drinks, and soda.  · Eat bland, easy-to-digest foods in small amounts as you are able. These foods include bananas, applesauce, rice, lean meats, toast, and crackers.  · Avoid alcohol.  · Avoid spicy or fatty foods.    Medicines  · Take over-the-counter and prescription medicines only as told by your health care provider.  · If you were prescribed an antibiotic medicine, take it as told by your health care provider. Do not stop using the antibiotic even if you start to feel better.  General instructions    · Wash your hands often using soap and water. If soap and water are not available, use a hand . Others in the household should wash their hands as well. Hands should be washed:  ? After using the toilet or changing a diaper.  ? Before preparing, cooking, or serving food.  ? While caring for a sick person or while visiting someone in a hospital.  · Drink enough fluid to keep your urine pale yellow.  · Rest at home while you recover.  · Watch your condition for any changes.  · Take a warm bath to relieve any burning or pain from frequent diarrhea episodes.  · Keep all follow-up visits as told by your health care provider. This is important.    Contact a health care provider if:  · You have a fever.  · Your diarrhea gets worse.  · You have new symptoms.  · You cannot keep fluids down.  · You feel light-headed or dizzy.  · You have a headache.  · You have muscle cramps.  Get help right away if:  · You have chest pain.  · You feel extremely weak or you  faint.  · You have bloody or black stools or stools that look like tar.  · You have severe pain, cramping, or bloating in your abdomen.  · You have trouble breathing or you are breathing very quickly.  · Your heart is beating very quickly.  · Your skin feels cold and clammy.  · You feel confused.  · You have signs of dehydration, such as:  ? Dark urine, very little urine, or no urine.  ? Cracked lips.  ? Dry mouth.  ? Sunken eyes.  ? Sleepiness.  ? Weakness.  Summary  · Diarrhea is frequent loose and watery bowel movements. Diarrhea can make you feel weak and cause you to become dehydrated.  · Drink enough fluids to keep your urine pale yellow.  · Make sure that you wash your hands after using the toilet. If soap and water are not available, use hand .  · Contact a health care provider if your diarrhea gets worse or you have new symptoms.  · Get help right away if you have signs of dehydration.  This information is not intended to replace advice given to you by your health care provider. Make sure you discuss any questions you have with your health care provider.  Document Revised: 05/05/2020 Document Reviewed: 05/24/2019  [employer] Patient Education © 2021 [employer]

## 2021-09-21 NOTE — PROGRESS NOTES
Subjective   Chief Complaint   Patient presents with   • Diarrhea   • Fever       Kelly Garcia is a 61 y.o. female.     Pt works for [employer] and failed the COVID screening tool today. She had symptoms of diarrhea, cramping and fever (max 100.2) last night. Symptoms have resolved, she is only tired today from lack of sleep.     Diarrhea   This is a new problem. The current episode started yesterday. Episode frequency: 5-6 times. The problem has been unchanged. The stool consistency is described as watery. The patient states that diarrhea awakens her from sleep. Associated symptoms include abdominal pain and a fever. Pertinent negatives include no arthralgias, bloating, chills, coughing, headaches, increased  flatus, myalgias, sweats, URI, vomiting or weight loss. She has tried nothing for the symptoms. Her past medical history is significant for irritable bowel syndrome.        Allergies   Allergen Reactions   • Penicillins Nausea And Vomiting and Shortness Of Breath   • Morphine Nausea And Vomiting and Hallucinations       Past Medical History:   Diagnosis Date   • Family history of colon cancer    • Umbilical hernia    • Varicose vein of leg        Past Surgical History:   Procedure Laterality Date   • BREAST BIOPSY     •  SECTION      x2   • CHOLECYSTECTOMY     • COLONOSCOPY N/A 2020    Procedure: COLONOSCOPY WITH POLYPECTOMY X'S 4;  Surgeon: Sen Chirinos MD;  Location: [facility] ENDOSCOPY;  Service: Gastroenterology;  Laterality: N/A;  POLYPS, INTERNAL AND EXTERNAL HEMORRHOIDS   • ERCP     • HAND SURGERY      partial amputation of right index finger and tendon repair left hand       Social History     Socioeconomic History   • Marital status: Single     Spouse name: Not on file   • Number of children: Not on file   • Years of education: Not on file   • Highest education level: Not on file   Tobacco Use   • Smoking status: Never Smoker   • Smokeless tobacco: Never Used   Substance  and Sexual Activity   • Alcohol use: Yes     Comment: 2-3 drinks/week   • Drug use: Never   • Sexual activity: Defer       Family History   Problem Relation Age of Onset   • Colon cancer Father    • Colon cancer Brother    • Breast cancer Other    • Breast cancer Sister    • Leukemia Maternal Aunt    • Lung cancer Maternal Uncle    • Cancer Brother         Renal   • Multiple myeloma Paternal Uncle    • Throat cancer Paternal Uncle          Current Outpatient Medications:   •  Diclofenac Sodium (VOLTAREN) 1 % gel gel, apply 2 gram by topical route 4 times every day to the affected area(s), Disp: 100 g, Rfl: 12  •  diclofenac sodium (VOTAREN XR) 100 MG 24 hr tablet, Take 1 tablet by mouth Daily., Disp: 30 tablet, Rfl: 12  •  omeprazole (priLOSEC) 40 MG capsule, Take 1 capsule by mouth Daily before a meal., Disp: 30 capsule, Rfl: 12      Review of Systems   Constitutional: Positive for fatigue and fever. Negative for chills, diaphoresis and unexpected weight loss.   HENT: Negative.    Respiratory: Negative for cough, chest tightness, shortness of breath and wheezing.    Gastrointestinal: Positive for abdominal pain and diarrhea. Negative for abdominal distention, bloating, blood in stool, flatus, nausea, vomiting and indigestion.   Musculoskeletal: Negative for arthralgias and myalgias.   Neurological: Negative for headache.        There were no vitals filed for this visit.    Objective   Physical Exam  Constitutional:       General: She is not in acute distress.     Appearance: She is ill-appearing. She is not toxic-appearing or diaphoretic.      Comments: Pt is laying in bed, appears tired     Pulmonary:      Effort: Pulmonary effort is normal.   Abdominal:      Tenderness: There is no abdominal tenderness (per pt).   Neurological:      Mental Status: She is alert and oriented to person, place, and time.   Psychiatric:         Mood and Affect: Mood normal.         Behavior: Behavior normal.          Procedures  "    Assessment/Plan   Diagnoses and all orders for this visit:    1. Diarrhea, unspecified type (Primary)  -     COVID-19,[facility] ROUTINE, NP/OP SWAB IN TRANSPORT MEDIA OR ESWAB 72 HR TAT - Swab, Nasopharynx; Future    2. Low grade fever  -     COVID-19,[facility] ROUTINE, NP/OP SWAB IN TRANSPORT MEDIA OR ESWAB 72 HR TAT - Swab, Nasopharynx; Future    Due to your symptoms I have ordered a COVID-19 test for you to rule this out. Please go to your nearest [facility] [place] for COVID-19 testing. Call before you arrive and let them know you have an order. We will call you with the results from a BLOCKED NUMBER, usually within 1-3 days.     For [facility] Employee's undergoing COVID-19 testing: Have your COVID test done within 24-48 hours of failing the screening tool. Contact [facility] at [phone] or [phone]. Leave a VM with your full name, employee ID, , exact details of symptoms or exposure. You will receive a call back within 24-72 hours with further info.     If your test is Negative: Complete the \"Return to Work Survey\" from "[employer]" (found on [employer]UB[employer]) to return to work.    If your test is Positive: Call [facility] immediately to inform of the positive result. Self-Quarantine until you are deemed no longer contagious (usually 10 days from symptom onset). Complete the \"Return to Work Survey\" found on [employer] to report your Positive test, then return within 24 hours of your anticipated return date in order to seek official clearance to return to work.      Increase fluids, follow a BLAND diet and rest today.  If symptoms worsen or do not improve follow up with your PCP or visit your nearest Urgent Care Center or ER.          PLAN: Discussed dosing, side effects, recommended other symptomatic care.  Patient should follow up with primary care provider if symptoms worsen, fail to resolve or other symptoms need attention. Patient/family agree to the above.         Jahaira Saldaña, KATINA     This " visit was performed via Telehealth.  This patient has been instructed to follow-up with their primary care provider if their symptoms worsen or the treatment provided does not resolve their illness.

## 2021-12-22 ENCOUNTER — HOSPITAL ENCOUNTER (OUTPATIENT)
Dept: CT IMAGING | Facility: HOSPITAL | Age: 61
Discharge: HOME | End: 2021-12-22
Admitting: FAMILY MEDICINE

## 2021-12-22 ENCOUNTER — TRANSCRIBE ORDERS (OUTPATIENT)
Dept: ADMINISTRATIVE | Facility: HOSPITAL | Age: 61
End: 2021-12-22

## 2021-12-22 DIAGNOSIS — R10.84 ABDOMINAL PAIN, GENERALIZED: Primary | ICD-10-CM

## 2021-12-22 DIAGNOSIS — R10.84 ABDOMINAL PAIN, GENERALIZED: ICD-10-CM

## 2021-12-22 PROCEDURE — 0 IOPAMIDOL PER 1 ML: Performed by: FAMILY MEDICINE

## 2021-12-22 PROCEDURE — 74177 CT ABD & PELVIS W/CONTRAST: CPT

## 2021-12-22 RX ADMIN — IOPAMIDOL 100 ML: 755 INJECTION, SOLUTION INTRAVENOUS at 14:12

## 2022-03-04 ENCOUNTER — TRANSCRIBE ORDERS (OUTPATIENT)
Dept: ADMINISTRATIVE | Facility: HOSPITAL | Age: 62
End: 2022-03-04

## 2022-03-08 ENCOUNTER — TRANSCRIBE ORDERS (OUTPATIENT)
Dept: ADMINISTRATIVE | Facility: HOSPITAL | Age: 62
End: 2022-03-08

## 2022-03-08 DIAGNOSIS — Z12.31 SCREENING MAMMOGRAM FOR HIGH-RISK PATIENT: Primary | ICD-10-CM

## 2022-03-14 ENCOUNTER — TRANSCRIBE ORDERS (OUTPATIENT)
Dept: ADMINISTRATIVE | Facility: HOSPITAL | Age: 62
End: 2022-03-14

## 2022-03-14 DIAGNOSIS — Z12.39 BREAST CANCER SCREENING, HIGH RISK PATIENT: Primary | ICD-10-CM

## 2022-03-15 ENCOUNTER — HOSPITAL ENCOUNTER (OUTPATIENT)
Dept: MAMMOGRAPHY | Facility: HOSPITAL | Age: 62
Discharge: HOME | End: 2022-03-15
Admitting: FAMILY MEDICINE

## 2022-03-15 DIAGNOSIS — Z12.31 SCREENING MAMMOGRAM FOR HIGH-RISK PATIENT: ICD-10-CM

## 2022-03-15 PROCEDURE — 77063 BREAST TOMOSYNTHESIS BI: CPT

## 2022-03-15 PROCEDURE — 77067 SCR MAMMO BI INCL CAD: CPT

## 2022-04-28 ENCOUNTER — HOSPITAL ENCOUNTER (OUTPATIENT)
Dept: GENERAL RADIOLOGY | Facility: HOSPITAL | Age: 62
Discharge: HOME | End: 2022-04-28
Admitting: NURSE PRACTITIONER

## 2022-04-28 ENCOUNTER — TRANSCRIBE ORDERS (OUTPATIENT)
Dept: PHYSICAL THERAPY | Facility: CLINIC | Age: 62
End: 2022-04-28

## 2022-04-28 ENCOUNTER — TRANSCRIBE ORDERS (OUTPATIENT)
Dept: ADMINISTRATIVE | Facility: HOSPITAL | Age: 62
End: 2022-04-28

## 2022-04-28 DIAGNOSIS — M54.2 NECK PAIN: ICD-10-CM

## 2022-04-28 DIAGNOSIS — M54.2 CHRONIC NECK PAIN: Primary | ICD-10-CM

## 2022-04-28 DIAGNOSIS — G89.29 CHRONIC NECK PAIN: Primary | ICD-10-CM

## 2022-04-28 DIAGNOSIS — M54.2 NECK PAIN: Primary | ICD-10-CM

## 2022-04-28 PROCEDURE — 72040 X-RAY EXAM NECK SPINE 2-3 VW: CPT

## 2022-05-26 ENCOUNTER — TREATMENT (OUTPATIENT)
Dept: PHYSICAL THERAPY | Facility: CLINIC | Age: 62
End: 2022-05-26

## 2022-05-26 DIAGNOSIS — G89.29 CHRONIC NECK PAIN: Primary | ICD-10-CM

## 2022-05-26 DIAGNOSIS — M54.2 CHRONIC NECK PAIN: Primary | ICD-10-CM

## 2022-05-26 PROCEDURE — 97140 MANUAL THERAPY 1/> REGIONS: CPT | Performed by: PHYSICAL THERAPIST

## 2022-05-26 PROCEDURE — 97161 PT EVAL LOW COMPLEX 20 MIN: CPT | Performed by: PHYSICAL THERAPIST

## 2022-05-26 PROCEDURE — 97110 THERAPEUTIC EXERCISES: CPT | Performed by: PHYSICAL THERAPIST

## 2022-05-26 NOTE — PROGRESS NOTES
Physical Therapy Initial Evaluation and Plan of Care    Patient: Kelly Garcia   : 1960  Diagnosis/ICD-10 Code:  Chronic neck pain [M54.2, G89.29]  Referring practitioner: KATINA Noble  Date of Initial Visit: 2022  Today's Date: 2022  Patient seen for 1 sessions           Subjective Questionnaire: NDI: 24%      Subjective Evaluation    History of Present Illness  Onset date: ~2 months.  Mechanism of injury: Sleep affected - sleeps on L side.  A few months of chiropractic yrs ago due to stiff neck.    X-ray: Advanced degenerative disc disease changes at C5-6 and C6-7 as well as advanced diffuse posterior lateral facet degenerative changes.    PMH: reviewed in Epic    Subjective comment: 62 y/o female with c/o pain in L neck and shoulder, occasionally down L arm; shooting sensation occasionally with movement; no N/T.   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   Precautions and Work Restrictions:                                                                                                                                                                                               Pain  Current pain ratin  At worst pain ratin  Location: L neck and shoulder  Quality: sharp, radiating and discomfort  Aggravating factors: movement and keyboarding (looking up to left or looking up in general)  Progression: no change    Social Support  Lives in: apartment (upstairs - one flight)  Lives with: spouse and adult children    Diagnostic Tests  X-ray: abnormal    Patient Goals  Patient goals for therapy: increased motion, decreased pain and return to sport/leisure activities             Objective        Special Questions  Patient is experiencing disturbed sleep.     Additional Special Questions  Neck pain affects sleep; dowager's hump; rounded and forward shoulders.      Tenderness   Cervical Spine   Tenderness in the left transverse process.     Additional Tenderness Details  L ~C5-6; C6-7 ; L  superior angle of scap and surrounding soft tissue    Neurological Testing     Sensation   Cervical/Thoracic   Left   Intact: light touch    Right   Intact: light touch    Reflexes   Left   Biceps (C5/C6): normal (2+)  Brachioradialis (C6): normal (2+)  Triceps (C7): normal (2+)    Right   Biceps (C5/C6): normal (2+)  Brachioradialis (C6): normal (2+)  Triceps (C7): normal (2+)    Active Range of Motion   Cervical/Thoracic Spine   Cervical    Flexion: Neck active flexion: pulling sensation. WFL  Extension: 47 degrees with pain  Left lateral flexion: 18 degrees with pain  Right lateral flexion: 22 degrees   Left rotation: 45 degrees with pain  Right rotation: 60 degrees     Additional Active Range of Motion Details  Extension and Rotation L increases symptoms.    Strength/Myotome Testing   Cervical Spine   Neck extension: 4-  Neck flexion: 4-    Left   Normal strength    Right   Normal strength    Tests   Cervical     Left   Positive active compression (Navarro), cervical distraction and Spurling's sign.     Additional Tests Details  Distraction relieves pain.      Rx: trial to determine if applicable; consent form signed; needle gauge, depth and precautions in accordance to IDN course parameters.    UE: Deep radial; Spinal Accessory; Greater Occipital; Suprascapular (infraspinatus); Lateral Antebrachial Cutaneous; Superficial Radial; Dorsal Scapular; Lateral Pectoral.      Access Code: [email or web address]  URL: [email or web address]  Date: 05/26/2022  Prepared by: Arpit Sprague    Exercises  Hooklying Neck Distraction and Traction - 1-2 x daily - 7 x weekly - 1-2 sets - 10 reps - 5 -10 hold  Seated Scapular Retraction - 1-2 x daily - 7 x weekly - 2 sets - 10 reps  Seated Shoulder Rolls - 1-2 x daily - 7 x weekly - 2 sets - 10 reps      Assessment & Plan     Assessment  Impairments: abnormal muscle tone, abnormal or restricted ROM, activity intolerance, impaired physical strength, lacks appropriate home exercise program  and pain with function  Other impairment: sleeping due to neck position.  Functional Limitations: sleeping, uncomfortable because of pain and unable to perform repetitive tasks  Assessment details: Pt presents to PT with cervical pain; poor posture; (+) Spurling's test; tenderness in lower cervical spine that responds well to distraction; trial of IDN to determine benefits - may continue as adjunctive self-pay. Impairments affect sleep; work activities (computer); and home IADSL's. Pt would benefit from skilled PT intervention to address the deficits noted and has the potential to improve in response to therapy.      Prognosis: good    Goals  Plan Goals: SHORT TERM GOALS: Time for goal Achievement: 4 weeks  1. Pt can demonstrate initial HEP.  2. Pt reports at least 50% improvement overall, improved neck AROM and less pain.  3. Pt can tolerate upper core strength ex.   4. Pt reports improve quality sleep, less interference from neck pain.    LONG TERN GOALS: Time for goal achievement: 2 months  1. NDI score <10% by DC.  2. Neck rotation improved to 75 degrees or better for improved safety when driving.  3. Pt reports she is ready for DC to Independent HEP for self management of her condition.   4. Pt sleeping without interference from neck and able to attend to all IADL's and computer work with min to no cervical pain.     Plan  Therapy options: will be seen for skilled therapy services  Planned modality interventions: cryotherapy, hydrotherapy, electrical stimulation/Russian stimulation, high voltage pulsed current (pain management), ultrasound, dry needling and thermotherapy (hydrocollator packs)  Planned therapy interventions: manual therapy, joint mobilization, home exercise program, functional ROM exercises, flexibility, strengthening, spinal/joint mobilization, soft tissue mobilization, postural training, therapeutic activities, stretching, neuromuscular re-education and body mechanics training  Frequency: 2x  week  Duration in visits: 10  Duration in weeks: 14  Treatment plan discussed with: patient  Plan details: 1-2 x weekly         History # of Personal Factors and/or Comorbidities: LOW (0)  Examination of Body System(s): # of elements: LOW (1-2)  Clinical Presentation: STABLE   Clinical Decision Making: LOW       Timed:         Manual Therapy:      8   mins  72684;     Therapeutic Exercise:     15    mins  81265;     Neuromuscular Hung:        mins  38781;    Therapeutic Activity:          mins  45785;     Gait Training:           mins  82259;     Ultrasound:          mins  48204;    Ionto                                  mins   64898  Self Care                            mins   02553  Aquatic                               mins 91494      Un-Timed:  Electrical Stimulation:         mins  33834 ( );  Dry Needling          mins self-pay  Traction          mins 83387  Low Eval      30    Mins  70254  Mod Eval          Mins  81585  High Eval                            Mins  36750  Re-Eval                               mins  47546        Timed Treatment:   23  mins   Total Treatment:     53   mins    PT SIGNATURE: Anoop Sprague PT   [place] License#: [id number]       Electronically signed by Anoop Sprague PT, 05/26/22, 1:06 PM EDT      Initial Certification  Certification Period:  5/26/2022 thru 8/23/2022  I certify that the therapy services are furnished while this patient is under my care.  The services outlined above are required by this patient, and will be reviewed every 90 days.       Physician Signature:__________________________________________________    PHYSICIAN: Maeve Grullon APRN      DATE:     Please sign and return via fax to [phone].. Thank you, [facility].

## 2022-06-29 ENCOUNTER — DOCUMENTATION (OUTPATIENT)
Dept: PHYSICAL THERAPY | Facility: CLINIC | Age: 62
End: 2022-06-29

## 2022-06-29 NOTE — PROGRESS NOTES
Discharge Summary  Discharge Summary from Physical Therapy Report    Patient: Kelly Garcia   : 1960  Diagnosis/ICD-10 Code:  Chronic neck pain [M54.2, G89.29]  Referring practitioner: KATINA Noble  Date of Initial Visit: 2022    Number of Visits: 1 eval only:   Discharge Status of Patient: cancelled remaining visits due to work issues and illness. Spoke to patient today: requested discharge and may obtain a new order to return in future - still doing HEP; neck pain intermittent and varies in intensity.       Goals: Not Met    Discharge Plan: Continue with current home exercise program as instructed  Future need for rehabilitation activities  Patient to return to referring/providing physician      Date of Discharge 22        Anoop Sprague, PT  Physical Therapist

## 2023-04-18 ENCOUNTER — HOSPITAL ENCOUNTER (OUTPATIENT)
Dept: MAMMOGRAPHY | Facility: HOSPITAL | Age: 63
Discharge: HOME | End: 2023-04-18
Admitting: FAMILY MEDICINE
Payer: COMMERCIAL

## 2023-04-18 DIAGNOSIS — Z12.31 SCREENING MAMMOGRAM FOR BREAST CANCER: ICD-10-CM

## 2023-04-18 PROCEDURE — 77067 SCR MAMMO BI INCL CAD: CPT

## 2023-04-18 PROCEDURE — 77063 BREAST TOMOSYNTHESIS BI: CPT

## 2023-12-04 NOTE — PROGRESS NOTES
Encounter Date:12/08/2023      Patient ID: Kelly Garcia is a 63 y.o. female.    Chief Complaint   Patient presents with    Abnormal ECG          History of Present Illness    63-year-old with history of obesity, GERD who comes in as a new patient consult for lightheadedness and dizziness and abnormal EKG.   She works in [employer] and she works the night shift and has noticed that she is getting episodes of lightheaded.  She says they are not orthostatic and happen randomly while she is standing or sitting.  She has never actually passed out.  She denies any cardiac history.  She used to be very active and was able to do about 3 to 4 miles on the treadmill 3 times a week but has not been doing that as much lately and would like to get back into it.  Her resting heart rate has always been low but she is able to get it up to the 130s to 140s range when she exercises.  Family history significant for hypertension.  No premature CAD.  Denies any chest pain.  She did have an EKG done in the hospital after 1 of these episodes and it read old anterior infarct and for this reason she had another EKG done with her PCP which had a lot of baseline artifact.  EKG in clinic today shows no evidence of old infarct and just low voltage in the precordial leads.  Does have whitecoat hypertension without a diagnosis of hypertension.  Says her blood pressure at home is consistently in the 130s.  The following portions of the patient's history were reviewed and updated as appropriate: allergies, current medications, past family history, past medical history, past social history, past surgical history, and problem list.    Review of Systems   Constitutional: Positive for malaise/fatigue.   Cardiovascular:  Negative for chest pain, dyspnea on exertion, leg swelling and palpitations.   Respiratory:  Negative for cough and shortness of breath.    Gastrointestinal:  Negative for abdominal pain, nausea and vomiting.   Neurological:  Positive for  dizziness and light-headedness. Negative for focal weakness, headaches and numbness.   All other systems reviewed and are negative.        Current Outpatient Medications:     diclofenac sodium (VOTAREN XR) 100 MG 24 hr tablet, TAKE 1 TABLET BY MOUTH EVERY DAY (Patient taking differently: Take 1 tablet by mouth As Needed.), Disp: 30 tablet, Rfl: 12    hydrOXYzine (ATARAX) 25 MG tablet, take 1 tablet by oral route 30-60 minutes before bedtime as needed for anxiety (Patient taking differently: Take 1 tablet by mouth At Night As Needed (sleep).), Disp: 30 tablet, Rfl: 1    latanoprost (XALATAN) 0.005 % ophthalmic solution, Instill 1 drop into each eye at bedtime, Disp: 2.5 mL, Rfl: 5    Diclofenac Sodium (VOLTAREN) 1 % gel gel, apply 2 gram by topical route 4 times every day to the affected area(s), Disp: 100 g, Rfl: 12    omeprazole (priLOSEC) 40 MG capsule, Take 1 capsule by mouth Daily before a meal., Disp: 30 capsule, Rfl: 12    Allergies   Allergen Reactions    Penicillins Nausea And Vomiting and Shortness Of Breath    Morphine Nausea And Vomiting and Hallucinations       Family History   Problem Relation Age of Onset    Colon cancer Father     Colon cancer Brother     Breast cancer Other     Breast cancer Sister     Leukemia Maternal Aunt     Lung cancer Maternal Uncle     Cancer Brother         Renal    Multiple myeloma Paternal Uncle     Throat cancer Paternal Uncle        Past Surgical History:   Procedure Laterality Date    BREAST BIOPSY Left      SECTION      x2    CHOLECYSTECTOMY      COLONOSCOPY N/A 2020    Procedure: COLONOSCOPY WITH POLYPECTOMY X'S 4;  Surgeon: Sen Chirinos MD;  Location: [facility] ENDOSCOPY;  Service: Gastroenterology;  Laterality: N/A;  POLYPS, INTERNAL AND EXTERNAL HEMORRHOIDS    ERCP      HAND SURGERY      partial amputation of right index finger and tendon repair left hand       Past Medical History:   Diagnosis Date    Family history of colon cancer      "Screening for colon cancer 07/16/2020    Umbilical hernia     Varicose vein of leg        Social History     Socioeconomic History    Marital status: Single   Tobacco Use    Smoking status: Never    Smokeless tobacco: Never   Vaping Use    Vaping Use: Never used   Substance and Sexual Activity    Alcohol use: Yes     Comment: 2-3 drinks/week    Drug use: Never    Sexual activity: Defer           ECG 12 Lead    Date/Time: 12/8/2023 10:29 AM  Performed by: Cielo Birmingham MD    Authorized by: Cielo Birmingham MD  Comparison: compared with previous ECG   Comparison to previous ECG: No anterior qwaves seen  Rhythm: sinus rhythm  Rate: normal  BPM: 64  Conduction: conduction normal  QRS axis: normal    Clinical impression: normal ECG  Comments: Low voltage in precordial leads            Objective:       Physical Exam    /75 (BP Location: Left arm, Patient Position: Sitting)   Pulse 58   Ht 162.6 cm (64\")   Wt 117 kg (259 lb)   LMP  (LMP Unknown)   SpO2 99%   BMI 44.46 kg/m²   The patient is alert, oriented and in no distress.  Obese    Vital signs as noted above.    Head and neck revealed no carotid bruits or jugular venous distension.  No thyromegaly or lymphadenopathy is present.    Lungs clear.  No wheezing.  Breath sounds are normal bilaterally.    Heart normal first and second heart sounds.  No murmur..  No pericardial rub is present.  No gallop is present.    Abdomen soft and nontender.  No organomegaly is present.    Extremities revealed good peripheral pulses without any pedal edema.    Skin warm and dry.    Musculoskeletal system is grossly normal.    CNS grossly normal.           Diagnosis Plan   1. Pre-syncope  Adult Transthoracic Echo Complete W/ Color, Spectral and Contrast if Necessary Per Protocol      2. White coat syndrome with high blood pressure without hypertension        3. Abnormal EKG        4. Preventative health care        5. Morbid obesity with BMI of 40.0-44.9, adult        LAB " RESULTS (LAST 7 DAYS)    CBC        BMP        CMP         BNP        TROPONIN        CoAg        Creatinine Clearance  CrCl cannot be calculated (Patient's most recent lab result is older than the maximum 30 days allowed.).    ABG        Radiology  No radiology results for the last day         Assessment and Plan       Diagnoses and all orders for this visit:    1. Pre-syncope (Primary)  -     Adult Transthoracic Echo Complete W/ Color, Spectral and Contrast if Necessary Per Protocol; Future    2. White coat syndrome with high blood pressure without hypertension    3. Abnormal EKG    4. Preventative health care    5. Morbid obesity with BMI of 40.0-44.9, adult    Other orders  -     ECG 12 Lead       Lightheadedness  Check echocardiogram  EKG showed low precordial voltage  No evidence of rhythm issues    Abnormal EKG  Repeat EKG in clinic today does not show any evidence of old infarct    Elevated blood pressure  She is hesitant to start any medications but I did explain to her that her goal blood pressure would be less than 120/80  She is going to start exercising and keeping an eye on her blood pressure at home and if it remains consistently high she will call me and I will start her on some treatment    Preventative care  Had lipid panel done with her PCP and was told that it was normal    Obesity  She is planning on starting working out again    Cielo Birmingham MD

## 2023-12-08 ENCOUNTER — OFFICE VISIT (OUTPATIENT)
Dept: CARDIOLOGY | Facility: CLINIC | Age: 63
End: 2023-12-08
Payer: COMMERCIAL

## 2023-12-08 VITALS
SYSTOLIC BLOOD PRESSURE: 155 MMHG | BODY MASS INDEX: 44.22 KG/M2 | HEART RATE: 58 BPM | DIASTOLIC BLOOD PRESSURE: 75 MMHG | HEIGHT: 64 IN | OXYGEN SATURATION: 99 % | WEIGHT: 259 LBS

## 2023-12-08 DIAGNOSIS — R94.31 ABNORMAL EKG: ICD-10-CM

## 2023-12-08 DIAGNOSIS — R03.0 WHITE COAT SYNDROME WITH HIGH BLOOD PRESSURE WITHOUT HYPERTENSION: ICD-10-CM

## 2023-12-08 DIAGNOSIS — E66.01 MORBID OBESITY WITH BMI OF 40.0-44.9, ADULT: ICD-10-CM

## 2023-12-08 DIAGNOSIS — Z00.00 PREVENTATIVE HEALTH CARE: ICD-10-CM

## 2023-12-08 DIAGNOSIS — R55 PRE-SYNCOPE: Primary | ICD-10-CM

## 2024-05-13 ENCOUNTER — TRANSCRIBE ORDERS (OUTPATIENT)
Dept: ADMINISTRATIVE | Facility: HOSPITAL | Age: 64
End: 2024-05-13
Payer: COMMERCIAL

## 2024-05-13 DIAGNOSIS — Z12.31 SCREENING MAMMOGRAM, ENCOUNTER FOR: Primary | ICD-10-CM

## 2024-09-22 ENCOUNTER — APPOINTMENT (OUTPATIENT)
Dept: CT IMAGING | Facility: HOSPITAL | Age: 64
End: 2024-09-22
Payer: COMMERCIAL

## 2024-09-22 ENCOUNTER — HOSPITAL ENCOUNTER (INPATIENT)
Facility: HOSPITAL | Age: 64
LOS: 3 days | Discharge: HOME | End: 2024-09-26
Attending: EMERGENCY MEDICINE | Admitting: INTERNAL MEDICINE
Payer: COMMERCIAL

## 2024-09-22 ENCOUNTER — APPOINTMENT (OUTPATIENT)
Dept: GENERAL RADIOLOGY | Facility: HOSPITAL | Age: 64
End: 2024-09-22
Payer: COMMERCIAL

## 2024-09-22 DIAGNOSIS — R06.03 ACUTE RESPIRATORY DISTRESS: Primary | ICD-10-CM

## 2024-09-22 DIAGNOSIS — J15.7 PNEUMONIA OF BOTH LUNGS DUE TO MYCOPLASMA PNEUMONIAE, UNSPECIFIED PART OF LUNG: ICD-10-CM

## 2024-09-22 DIAGNOSIS — J98.01 BRONCHOSPASM: ICD-10-CM

## 2024-09-22 DIAGNOSIS — J18.9 PNEUMONIA OF BOTH LUNGS DUE TO INFECTIOUS ORGANISM, UNSPECIFIED PART OF LUNG: ICD-10-CM

## 2024-09-22 LAB
ALBUMIN SERPL-MCNC: 3.8 G/DL (ref 3.5–5.2)
ALBUMIN/GLOB SERPL: 1.1 G/DL
ALP SERPL-CCNC: 183 U/L (ref 39–117)
ALT SERPL W P-5'-P-CCNC: 76 U/L (ref 1–33)
ANION GAP SERPL CALCULATED.3IONS-SCNC: 12.4 MMOL/L (ref 5–15)
AST SERPL-CCNC: 62 U/L (ref 1–32)
B PARAPERT DNA SPEC QL NAA+PROBE: NOT DETECTED
B PERT DNA SPEC QL NAA+PROBE: NOT DETECTED
BASOPHILS # BLD AUTO: 0.03 10*3/MM3 (ref 0–0.2)
BASOPHILS NFR BLD AUTO: 0.2 % (ref 0–1.5)
BILIRUB SERPL-MCNC: 0.6 MG/DL (ref 0–1.2)
BUN SERPL-MCNC: 8 MG/DL (ref 8–23)
BUN/CREAT SERPL: 13.8 (ref 7–25)
C PNEUM DNA NPH QL NAA+NON-PROBE: NOT DETECTED
CALCIUM SPEC-SCNC: 9.3 MG/DL (ref 8.6–10.5)
CHLORIDE SERPL-SCNC: 100 MMOL/L (ref 98–107)
CO2 SERPL-SCNC: 25.6 MMOL/L (ref 22–29)
CREAT SERPL-MCNC: 0.58 MG/DL (ref 0.57–1)
D-LACTATE SERPL-SCNC: 0.5 MMOL/L (ref 0.3–2)
DEPRECATED RDW RBC AUTO: 45.9 FL (ref 37–54)
EGFRCR SERPLBLD CKD-EPI 2021: 102.5 ML/MIN/1.73
EOSINOPHIL # BLD AUTO: 0.12 10*3/MM3 (ref 0–0.4)
EOSINOPHIL NFR BLD AUTO: 0.9 % (ref 0.3–6.2)
ERYTHROCYTE [DISTWIDTH] IN BLOOD BY AUTOMATED COUNT: 13.5 % (ref 12.3–15.4)
FLUAV SUBTYP SPEC NAA+PROBE: NOT DETECTED
FLUBV RNA ISLT QL NAA+PROBE: NOT DETECTED
GLOBULIN UR ELPH-MCNC: 3.4 GM/DL
GLUCOSE SERPL-MCNC: 108 MG/DL (ref 65–99)
HADV DNA SPEC NAA+PROBE: NOT DETECTED
HCOV 229E RNA SPEC QL NAA+PROBE: NOT DETECTED
HCOV HKU1 RNA SPEC QL NAA+PROBE: NOT DETECTED
HCOV NL63 RNA SPEC QL NAA+PROBE: NOT DETECTED
HCOV OC43 RNA SPEC QL NAA+PROBE: NOT DETECTED
HCT VFR BLD AUTO: 39.3 % (ref 34–46.6)
HGB BLD-MCNC: 13 G/DL (ref 12–15.9)
HMPV RNA NPH QL NAA+NON-PROBE: NOT DETECTED
HPIV1 RNA ISLT QL NAA+PROBE: NOT DETECTED
HPIV2 RNA SPEC QL NAA+PROBE: NOT DETECTED
HPIV3 RNA NPH QL NAA+PROBE: NOT DETECTED
HPIV4 P GENE NPH QL NAA+PROBE: NOT DETECTED
IMM GRANULOCYTES # BLD AUTO: 0.05 10*3/MM3 (ref 0–0.05)
IMM GRANULOCYTES NFR BLD AUTO: 0.4 % (ref 0–0.5)
LYMPHOCYTES # BLD AUTO: 1.37 10*3/MM3 (ref 0.7–3.1)
LYMPHOCYTES NFR BLD AUTO: 10.4 % (ref 19.6–45.3)
M PNEUMO IGG SER IA-ACNC: DETECTED
MCH RBC QN AUTO: 30.7 PG (ref 26.6–33)
MCHC RBC AUTO-ENTMCNC: 33.1 G/DL (ref 31.5–35.7)
MCV RBC AUTO: 92.9 FL (ref 79–97)
MONOCYTES # BLD AUTO: 0.88 10*3/MM3 (ref 0.1–0.9)
MONOCYTES NFR BLD AUTO: 6.7 % (ref 5–12)
NEUTROPHILS NFR BLD AUTO: 10.67 10*3/MM3 (ref 1.7–7)
NEUTROPHILS NFR BLD AUTO: 81.4 % (ref 42.7–76)
NRBC BLD AUTO-RTO: 0 /100 WBC (ref 0–0.2)
NT-PROBNP SERPL-MCNC: 57.6 PG/ML (ref 0–900)
PLATELET # BLD AUTO: 256 10*3/MM3 (ref 140–450)
PMV BLD AUTO: 9.6 FL (ref 6–12)
POTASSIUM SERPL-SCNC: 3.8 MMOL/L (ref 3.5–5.2)
PROT SERPL-MCNC: 7.2 G/DL (ref 6–8.5)
RBC # BLD AUTO: 4.23 10*6/MM3 (ref 3.77–5.28)
RHINOVIRUS RNA SPEC NAA+PROBE: NOT DETECTED
RSV RNA NPH QL NAA+NON-PROBE: NOT DETECTED
SARS-COV-2 RNA NPH QL NAA+NON-PROBE: NOT DETECTED
SODIUM SERPL-SCNC: 138 MMOL/L (ref 136–145)
TROPONIN T SERPL HS-MCNC: <6 NG/L
WBC NRBC COR # BLD AUTO: 13.12 10*3/MM3 (ref 3.4–10.8)

## 2024-09-22 PROCEDURE — 25510000001 IOPAMIDOL PER 1 ML: Performed by: EMERGENCY MEDICINE

## 2024-09-22 PROCEDURE — 99285 EMERGENCY DEPT VISIT HI MDM: CPT

## 2024-09-22 PROCEDURE — 36415 COLL VENOUS BLD VENIPUNCTURE: CPT

## 2024-09-22 PROCEDURE — 0202U NFCT DS 22 TRGT SARS-COV-2: CPT | Performed by: EMERGENCY MEDICINE

## 2024-09-22 PROCEDURE — 71275 CT ANGIOGRAPHY CHEST: CPT

## 2024-09-22 PROCEDURE — 87040 BLOOD CULTURE FOR BACTERIA: CPT | Performed by: EMERGENCY MEDICINE

## 2024-09-22 PROCEDURE — 94640 AIRWAY INHALATION TREATMENT: CPT

## 2024-09-22 PROCEDURE — 25010000002 CEFTRIAXONE PER 250 MG: Performed by: EMERGENCY MEDICINE

## 2024-09-22 PROCEDURE — 83880 ASSAY OF NATRIURETIC PEPTIDE: CPT | Performed by: EMERGENCY MEDICINE

## 2024-09-22 PROCEDURE — 71045 X-RAY EXAM CHEST 1 VIEW: CPT

## 2024-09-22 PROCEDURE — 83605 ASSAY OF LACTIC ACID: CPT

## 2024-09-22 PROCEDURE — 93005 ELECTROCARDIOGRAM TRACING: CPT | Performed by: EMERGENCY MEDICINE

## 2024-09-22 PROCEDURE — 80053 COMPREHEN METABOLIC PANEL: CPT | Performed by: EMERGENCY MEDICINE

## 2024-09-22 PROCEDURE — 85025 COMPLETE CBC W/AUTO DIFF WBC: CPT | Performed by: EMERGENCY MEDICINE

## 2024-09-22 PROCEDURE — 84484 ASSAY OF TROPONIN QUANT: CPT | Performed by: EMERGENCY MEDICINE

## 2024-09-22 PROCEDURE — 94799 UNLISTED PULMONARY SVC/PX: CPT

## 2024-09-22 RX ORDER — IPRATROPIUM BROMIDE AND ALBUTEROL SULFATE 2.5; .5 MG/3ML; MG/3ML
3 SOLUTION RESPIRATORY (INHALATION) ONCE
Status: COMPLETED | OUTPATIENT
Start: 2024-09-22 | End: 2024-09-22

## 2024-09-22 RX ORDER — IOPAMIDOL 755 MG/ML
100 INJECTION, SOLUTION INTRAVASCULAR
Status: COMPLETED | OUTPATIENT
Start: 2024-09-22 | End: 2024-09-22

## 2024-09-22 RX ORDER — SODIUM CHLORIDE 0.9 % (FLUSH) 0.9 %
10 SYRINGE (ML) INJECTION AS NEEDED
Status: DISCONTINUED | OUTPATIENT
Start: 2024-09-22 | End: 2024-09-26 | Stop reason: HOSPADM

## 2024-09-22 RX ADMIN — DOXYCYCLINE 100 MG: 100 INJECTION, POWDER, LYOPHILIZED, FOR SOLUTION INTRAVENOUS at 22:13

## 2024-09-22 RX ADMIN — IPRATROPIUM BROMIDE AND ALBUTEROL SULFATE 3 ML: .5; 3 SOLUTION RESPIRATORY (INHALATION) at 20:44

## 2024-09-22 RX ADMIN — CEFTRIAXONE 2000 MG: 2 INJECTION, POWDER, FOR SOLUTION INTRAMUSCULAR; INTRAVENOUS at 21:34

## 2024-09-22 RX ADMIN — IOPAMIDOL 100 ML: 755 INJECTION, SOLUTION INTRAVENOUS at 23:17

## 2024-09-23 PROBLEM — J15.7 PNEUMONIA OF BOTH LUNGS DUE TO MYCOPLASMA PNEUMONIAE: Status: ACTIVE | Noted: 2024-09-23

## 2024-09-23 PROBLEM — J98.01 BRONCHOSPASM: Status: ACTIVE | Noted: 2024-09-23

## 2024-09-23 PROBLEM — J18.9 PNEUMONIA OF BOTH LUNGS DUE TO INFECTIOUS ORGANISM: Status: ACTIVE | Noted: 2024-09-23

## 2024-09-23 PROBLEM — R06.03 ACUTE RESPIRATORY DISTRESS: Status: ACTIVE | Noted: 2024-09-23

## 2024-09-23 PROBLEM — J18.9 PNEUMONIA OF RIGHT LOWER LOBE DUE TO INFECTIOUS ORGANISM: Status: ACTIVE | Noted: 2024-09-23

## 2024-09-23 LAB
ANION GAP SERPL CALCULATED.3IONS-SCNC: 10.5 MMOL/L (ref 5–15)
BASOPHILS # BLD AUTO: 0.02 10*3/MM3 (ref 0–0.2)
BASOPHILS NFR BLD AUTO: 0.2 % (ref 0–1.5)
BUN SERPL-MCNC: 7 MG/DL (ref 8–23)
BUN/CREAT SERPL: 10.9 (ref 7–25)
CALCIUM SPEC-SCNC: 9 MG/DL (ref 8.6–10.5)
CHLORIDE SERPL-SCNC: 101 MMOL/L (ref 98–107)
CO2 SERPL-SCNC: 27.5 MMOL/L (ref 22–29)
CREAT SERPL-MCNC: 0.64 MG/DL (ref 0.57–1)
DEPRECATED RDW RBC AUTO: 46.6 FL (ref 37–54)
EGFRCR SERPLBLD CKD-EPI 2021: 100.1 ML/MIN/1.73
EOSINOPHIL # BLD AUTO: 0.08 10*3/MM3 (ref 0–0.4)
EOSINOPHIL NFR BLD AUTO: 0.7 % (ref 0.3–6.2)
ERYTHROCYTE [DISTWIDTH] IN BLOOD BY AUTOMATED COUNT: 13.6 % (ref 12.3–15.4)
GEN 5 2HR TROPONIN T REFLEX: <6 NG/L
GLUCOSE SERPL-MCNC: 105 MG/DL (ref 65–99)
HCT VFR BLD AUTO: 37 % (ref 34–46.6)
HGB BLD-MCNC: 12.3 G/DL (ref 12–15.9)
IMM GRANULOCYTES # BLD AUTO: 0.06 10*3/MM3 (ref 0–0.05)
IMM GRANULOCYTES NFR BLD AUTO: 0.5 % (ref 0–0.5)
LYMPHOCYTES # BLD AUTO: 1.42 10*3/MM3 (ref 0.7–3.1)
LYMPHOCYTES NFR BLD AUTO: 12.6 % (ref 19.6–45.3)
MCH RBC QN AUTO: 31 PG (ref 26.6–33)
MCHC RBC AUTO-ENTMCNC: 33.2 G/DL (ref 31.5–35.7)
MCV RBC AUTO: 93.2 FL (ref 79–97)
MONOCYTES # BLD AUTO: 0.91 10*3/MM3 (ref 0.1–0.9)
MONOCYTES NFR BLD AUTO: 8.1 % (ref 5–12)
NEUTROPHILS NFR BLD AUTO: 77.9 % (ref 42.7–76)
NEUTROPHILS NFR BLD AUTO: 8.77 10*3/MM3 (ref 1.7–7)
NRBC BLD AUTO-RTO: 0 /100 WBC (ref 0–0.2)
PLATELET # BLD AUTO: 249 10*3/MM3 (ref 140–450)
PMV BLD AUTO: 9.6 FL (ref 6–12)
POTASSIUM SERPL-SCNC: 3.9 MMOL/L (ref 3.5–5.2)
QT INTERVAL: 324 MS
QTC INTERVAL: 424 MS
RBC # BLD AUTO: 3.97 10*6/MM3 (ref 3.77–5.28)
SODIUM SERPL-SCNC: 139 MMOL/L (ref 136–145)
TROPONIN T DELTA: NORMAL
WBC NRBC COR # BLD AUTO: 11.26 10*3/MM3 (ref 3.4–10.8)

## 2024-09-23 PROCEDURE — 85025 COMPLETE CBC W/AUTO DIFF WBC: CPT

## 2024-09-23 PROCEDURE — 94799 UNLISTED PULMONARY SVC/PX: CPT

## 2024-09-23 PROCEDURE — 25010000002 ENOXAPARIN PER 10 MG: Performed by: INTERNAL MEDICINE

## 2024-09-23 PROCEDURE — 63710000001 PROMETHAZINE PER 25 MG: Performed by: INTERNAL MEDICINE

## 2024-09-23 PROCEDURE — 80048 BASIC METABOLIC PNL TOTAL CA: CPT

## 2024-09-23 RX ORDER — BISACODYL 10 MG
10 SUPPOSITORY, RECTAL RECTAL DAILY PRN
Status: DISCONTINUED | OUTPATIENT
Start: 2024-09-23 | End: 2024-09-26 | Stop reason: HOSPADM

## 2024-09-23 RX ORDER — IPRATROPIUM BROMIDE AND ALBUTEROL SULFATE 2.5; .5 MG/3ML; MG/3ML
3 SOLUTION RESPIRATORY (INHALATION) EVERY 4 HOURS PRN
Status: DISCONTINUED | OUTPATIENT
Start: 2024-09-23 | End: 2024-09-26 | Stop reason: HOSPADM

## 2024-09-23 RX ORDER — GUAIFENESIN 200 MG/10ML
200 LIQUID ORAL EVERY 4 HOURS PRN
Status: DISCONTINUED | OUTPATIENT
Start: 2024-09-23 | End: 2024-09-26 | Stop reason: HOSPADM

## 2024-09-23 RX ORDER — ONDANSETRON 2 MG/ML
4 INJECTION INTRAMUSCULAR; INTRAVENOUS EVERY 6 HOURS PRN
Status: DISCONTINUED | OUTPATIENT
Start: 2024-09-23 | End: 2024-09-26 | Stop reason: HOSPADM

## 2024-09-23 RX ORDER — BUDESONIDE 0.5 MG/2ML
0.5 INHALANT ORAL
Status: DISCONTINUED | OUTPATIENT
Start: 2024-09-23 | End: 2024-09-26 | Stop reason: HOSPADM

## 2024-09-23 RX ORDER — DICLOFENAC SODIUM 100 MG/1
100 TABLET, FILM COATED, EXTENDED RELEASE ORAL DAILY PRN
COMMUNITY

## 2024-09-23 RX ORDER — SODIUM CHLORIDE 0.9 % (FLUSH) 0.9 %
10 SYRINGE (ML) INJECTION AS NEEDED
Status: DISCONTINUED | OUTPATIENT
Start: 2024-09-23 | End: 2024-09-26 | Stop reason: HOSPADM

## 2024-09-23 RX ORDER — ENOXAPARIN SODIUM 100 MG/ML
40 INJECTION SUBCUTANEOUS 2 TIMES DAILY
Status: DISCONTINUED | OUTPATIENT
Start: 2024-09-23 | End: 2024-09-26 | Stop reason: HOSPADM

## 2024-09-23 RX ORDER — POLYETHYLENE GLYCOL 3350 17 G/17G
17 POWDER, FOR SOLUTION ORAL DAILY PRN
Status: DISCONTINUED | OUTPATIENT
Start: 2024-09-23 | End: 2024-09-26 | Stop reason: HOSPADM

## 2024-09-23 RX ORDER — BISACODYL 5 MG/1
5 TABLET, DELAYED RELEASE ORAL DAILY PRN
Status: DISCONTINUED | OUTPATIENT
Start: 2024-09-23 | End: 2024-09-26 | Stop reason: HOSPADM

## 2024-09-23 RX ORDER — AMOXICILLIN 250 MG
2 CAPSULE ORAL 2 TIMES DAILY PRN
Status: DISCONTINUED | OUTPATIENT
Start: 2024-09-23 | End: 2024-09-26 | Stop reason: HOSPADM

## 2024-09-23 RX ORDER — SODIUM CHLORIDE 9 MG/ML
40 INJECTION, SOLUTION INTRAVENOUS AS NEEDED
Status: DISCONTINUED | OUTPATIENT
Start: 2024-09-23 | End: 2024-09-26 | Stop reason: HOSPADM

## 2024-09-23 RX ORDER — PROMETHAZINE HYDROCHLORIDE 25 MG/1
25 TABLET ORAL EVERY 6 HOURS PRN
Status: DISCONTINUED | OUTPATIENT
Start: 2024-09-23 | End: 2024-09-26 | Stop reason: HOSPADM

## 2024-09-23 RX ORDER — ACETAMINOPHEN 325 MG/1
650 TABLET ORAL EVERY 6 HOURS PRN
Status: DISCONTINUED | OUTPATIENT
Start: 2024-09-23 | End: 2024-09-26 | Stop reason: HOSPADM

## 2024-09-23 RX ORDER — SODIUM CHLORIDE 0.9 % (FLUSH) 0.9 %
10 SYRINGE (ML) INJECTION EVERY 12 HOURS SCHEDULED
Status: DISCONTINUED | OUTPATIENT
Start: 2024-09-23 | End: 2024-09-26 | Stop reason: HOSPADM

## 2024-09-23 RX ORDER — NITROGLYCERIN 0.4 MG/1
0.4 TABLET SUBLINGUAL
Status: DISCONTINUED | OUTPATIENT
Start: 2024-09-23 | End: 2024-09-26 | Stop reason: HOSPADM

## 2024-09-23 RX ADMIN — ENOXAPARIN SODIUM 40 MG: 100 INJECTION SUBCUTANEOUS at 20:03

## 2024-09-23 RX ADMIN — ACETAMINOPHEN 650 MG: 325 TABLET, FILM COATED ORAL at 20:02

## 2024-09-23 RX ADMIN — GUAIFENESIN 200 MG: 200 SOLUTION ORAL at 16:59

## 2024-09-23 RX ADMIN — DOXYCYCLINE 100 MG: 100 INJECTION, POWDER, LYOPHILIZED, FOR SOLUTION INTRAVENOUS at 09:23

## 2024-09-23 RX ADMIN — GUAIFENESIN 200 MG: 200 SOLUTION ORAL at 11:55

## 2024-09-23 RX ADMIN — PROMETHAZINE HYDROCHLORIDE 25 MG: 25 TABLET ORAL at 16:59

## 2024-09-23 RX ADMIN — Medication 10 ML: at 08:21

## 2024-09-23 RX ADMIN — BUDESONIDE 0.5 MG: 0.5 INHALANT RESPIRATORY (INHALATION) at 19:30

## 2024-09-23 RX ADMIN — Medication 10 ML: at 20:03

## 2024-09-23 RX ADMIN — ACETAMINOPHEN 650 MG: 325 TABLET, FILM COATED ORAL at 09:23

## 2024-09-23 RX ADMIN — DOXYCYCLINE 100 MG: 100 INJECTION, POWDER, LYOPHILIZED, FOR SOLUTION INTRAVENOUS at 22:05

## 2024-09-23 RX ADMIN — GUAIFENESIN 200 MG: 200 SOLUTION ORAL at 02:55

## 2024-09-23 RX ADMIN — IPRATROPIUM BROMIDE AND ALBUTEROL SULFATE 3 ML: .5; 3 SOLUTION RESPIRATORY (INHALATION) at 19:30

## 2024-09-23 RX ADMIN — Medication 5 MG: at 02:55

## 2024-09-23 NOTE — H&P
Patient Care Team:  Loida Valente MD as PCP - General (Family Medicine)  Cielo Birmingham MD as Consulting Physician (Interventional Cardiology)    Chief complaint shortness of breath, cough    Subjective     Patient is a 64 y.o. female who presented to the ER with complaints of shortness of breath and cough over the last week that has increasingly gotten worse. She recently went to [place] and started with symptoms when home She had been using over the counter cough medicine without relief so she stopped taking them 4 days go. She denies fever, chills, nausea, vomiting, diarrhea, chest pain, dizziness, syncope.   ER course: EKG normal sinus rhythm, ALT 76, AST 62, alk phos 183, troponin negative x2. CXR shows RLLL infiltrate. CT chest with no PE but does confirm pneumonia. Patient remained on room air with sat's 90-91%. She was started on doxycycline and rocephin. Respiratory panel positive for mycoplasma.     Onset of symptoms was 1 week    Review of Systems   Constitutional: Negative.    HENT: Negative.     Eyes: Negative.    Respiratory:  Positive for cough and shortness of breath.    Cardiovascular: Negative.    Gastrointestinal: Negative.    Endocrine: Negative.    Genitourinary: Negative.    Musculoskeletal: Negative.    Skin: Negative.    Neurological: Negative.    Psychiatric/Behavioral: Negative.            History  Past Medical History:   Diagnosis Date    Family history of colon cancer     Screening for colon cancer 2020    Umbilical hernia     Varicose vein of leg      Past Surgical History:   Procedure Laterality Date    BREAST BIOPSY Left      SECTION      x2    CHOLECYSTECTOMY      COLONOSCOPY N/A 2020    Procedure: COLONOSCOPY WITH POLYPECTOMY X'S 4;  Surgeon: Sen Chirinos MD;  Location: [facility] ENDOSCOPY;  Service: Gastroenterology;  Laterality: N/A;  POLYPS, INTERNAL AND EXTERNAL HEMORRHOIDS    ERCP      HAND SURGERY      partial amputation of right index  finger and tendon repair left hand     Family History   Problem Relation Age of Onset    Colon cancer Father     Colon cancer Brother     Breast cancer Other     Breast cancer Sister     Leukemia Maternal Aunt     Lung cancer Maternal Uncle     Cancer Brother         Renal    Multiple myeloma Paternal Uncle     Throat cancer Paternal Uncle      Social History     Tobacco Use    Smoking status: Never    Smokeless tobacco: Never   Vaping Use    Vaping status: Never Used   Substance Use Topics    Alcohol use: Yes     Comment: 2-3 drinks/week    Drug use: Never     Medications Prior to Admission   Medication Sig Dispense Refill Last Dose    diclofenac sodium (VOTAREN XR) 100 MG 24 hr tablet TAKE 1 TABLET BY MOUTH EVERY DAY (Patient taking differently: Take 1 tablet by mouth Daily As Needed.) 30 tablet 12 Past Week    sertraline (Zoloft) 25 MG tablet take 1 tablet by oral route  every day (Patient not taking: Reported on 9/23/2024) 30 tablet 1 Unknown     Allergies:  Penicillins and Morphine    Objective     Vital Signs  Temp:  [99.1 °F (37.3 °C)-99.9 °F (37.7 °C)] 99.1 °F (37.3 °C)  Heart Rate:  [] 97  Resp:  [18-34] 34  BP: (112-147)/(55-81) 130/65     Physical Exam:      General Appearance:    Alert, cooperative, in no acute distress   Head:    Normocephalic, without obvious abnormality, atraumatic   Eyes:            Lids and lashes normal, conjunctivae and sclerae normal, no   icterus, no pallor, corneas clear, PERRLA   Ears:    Ears appear intact with no abnormalities noted   Throat:   No oral lesions, no thrush, oral mucosa moist   Neck:   No adenopathy, supple, trachea midline, no thyromegaly, no   carotid bruit, no JVD   Lungs:     Mild wheezing, rhonchi present bilaterally,respirations regular, even and                  unlabored    Heart:    Regular rhythm and normal rate, normal S1 and S2, no            murmur, no gallop, no rub, no click   Chest Wall:    No abnormalities observed   Abdomen:     Normal  bowel sounds, no masses, no organomegaly, soft        non-tender, non-distended, no guarding, no rebound                tenderness   Extremities:   Moves all extremities well, no edema, no cyanosis, no             redness   Pulses:   Pulses palpable and equal bilaterally   Skin:   No bleeding, bruising or rash   Lymph nodes:   No palpable adenopathy   Neurologic:   No focal deficits noted       Results Review:     Imaging Results (Last 24 Hours)       Procedure Component Value Units Date/Time    CT Angiogram Chest Pulmonary Embolism [[id number]] Collected: 09/22/24 2353     Updated: 09/22/24 2359    Narrative:      CT ANGIOGRAM CHEST PULMONARY EMBOLISM    Date of Exam: 9/22/2024 11:16 PM EDT    Indication: Short of breath recent travel to [place].    Comparison: None available.    Technique: Axial CT images were obtained of the chest after the uneventful intravenous administration of iodinated contrast utilizing pulmonary embolism protocol.  Sagittal and coronal reconstructions were performed.  Automated exposure control and   iterative reconstruction methods were used.      Findings:  Hilum and Mediastinum: No pathologically enlarged lymph nodes.  Normal heart size.   No pericardial effusion.  Unremarkable thoracic aorta and pulmonary arteries.    Lung Parenchyma and Pleura: There are diffuse bilateral tree-in-bud airspace opacities greatest in the right lower lobe but seen diffusely throughout both lungs. These findings are consistent with diffuse pneumonia, most likely atypical pneumonia.    Upper Abdomen: There are clips from cholecystectomy. There is mild fatty infiltration of the liver.    Soft tissues: Unremarkable.    Osseous structures: No aggressive focal lytic or sclerotic osseous lesions.      Impression:      Impression:  1.Diffuse bilateral tree-in-bud airspace opacities consistent with pneumonia.  2.No evidence of pulmonary embolus.        Electronically Signed: Gianfranco Pandya MD    9/22/2024 11:57 PM  EDT    Workstation ID: [id number]    XR Chest 1 View [[id number]] Collected: 09/22/24 2054     Updated: 09/22/24 2057    Narrative:      XR CHEST 1 VW    Date of Exam: 9/22/2024 8:50 PM EDT    Indication: Short of breath and cough    Comparison: None available.    Findings:  The heart size and pulmonary vascular markings are normal. The left lung is clear. There is right perihilar and right lower lobe airspace disease consistent with pneumonia.      Impression:      Impression:  Right perihilar and right lower lobe pneumonia.        Electronically Signed: Gianfranco Pandya MD    9/22/2024 8:55 PM EDT    Workstation ID: [id number]             Lab Results (last 24 hours)       Procedure Component Value Units Date/Time    Basic Metabolic Panel [[id number]]  (Abnormal) Collected: 09/23/24 0319    Specimen: Blood from Arm, Left Updated: 09/23/24 0356     Glucose 105 mg/dL      BUN 7 mg/dL      Creatinine 0.64 mg/dL      Sodium 139 mmol/L      Potassium 3.9 mmol/L      Chloride 101 mmol/L      CO2 27.5 mmol/L      Calcium 9.0 mg/dL      BUN/Creatinine Ratio 10.9     Anion Gap 10.5 mmol/L      eGFR 100.1 mL/min/1.73     Narrative:      GFR Normal >60  Chronic Kidney Disease <60  Kidney Failure <15      CBC & Differential [[id number]]  (Abnormal) Collected: 09/23/24 0319    Specimen: Blood from Arm, Left Updated: 09/23/24 0328    Narrative:      The following orders were created for panel order CBC & Differential.  Procedure                               Abnormality         Status                     ---------                               -----------         ------                     CBC Auto Differential[[id number]]        Abnormal            Final result                 Please view results for these tests on the individual orders.    CBC Auto Differential [[id number]]  (Abnormal) Collected: 09/23/24 0319    Specimen: Blood from Arm, Left Updated: 09/23/24 0328     WBC 11.26 10*3/mm3      RBC 3.97 10*6/mm3      Hemoglobin 12.3 g/dL       Hematocrit 37.0 %      MCV 93.2 fL      MCH 31.0 pg      MCHC 33.2 g/dL      RDW 13.6 %      RDW-SD 46.6 fl      MPV 9.6 fL      Platelets 249 10*3/mm3      Neutrophil % 77.9 %      Lymphocyte % 12.6 %      Monocyte % 8.1 %      Eosinophil % 0.7 %      Basophil % 0.2 %      Immature Grans % 0.5 %      Neutrophils, Absolute 8.77 10*3/mm3      Lymphocytes, Absolute 1.42 10*3/mm3      Monocytes, Absolute 0.91 10*3/mm3      Eosinophils, Absolute 0.08 10*3/mm3      Basophils, Absolute 0.02 10*3/mm3      Immature Grans, Absolute 0.06 10*3/mm3      nRBC 0.0 /100 WBC     High Sensitivity Troponin T 2Hr [[id number]] Collected: 09/22/24 2326    Specimen: Blood from Arm, Left Updated: 09/23/24 0003     HS Troponin T <6 ng/L      Troponin T Delta --     Comment: Unable to calculate.       Narrative:      High Sensitive Troponin T Reference Range:  <14.0 ng/L- Negative Female for AMI  <22.0 ng/L- Negative Male for AMI  >=14 - Abnormal Female indicating possible myocardial injury.  >=22 - Abnormal Male indicating possible myocardial injury.   Clinicians would have to utilize clinical acumen, EKG, Troponin, and serial changes to determine if it is an Acute Myocardial Infarction or myocardial injury due to an underlying chronic condition.         Respiratory Panel PCR w/COVID-19(SARS-CoV-2) [facility]/BERNADETTE/MARIEL/PAD/[facility]GET In-House, NP Swab in UTM/VTM, 2 HR TAT - Swab, Nasopharynx [[id number]]  (Abnormal) Collected: 09/22/24 2106    Specimen: Swab from Nasopharynx Updated: 09/22/24 2218     ADENOVIRUS, PCR Not Detected     Coronavirus 229E Not Detected     Coronavirus HKU1 Not Detected     Coronavirus NL63 Not Detected     Coronavirus OC43 Not Detected     COVID19 Not Detected     Human Metapneumovirus Not Detected     Human Rhinovirus/Enterovirus Not Detected     Influenza A PCR Not Detected     Influenza B PCR Not Detected     Parainfluenza Virus 1 Not Detected     Parainfluenza Virus 2 Not Detected     Parainfluenza Virus 3 Not  Detected     Parainfluenza Virus 4 Not Detected     RSV, PCR Not Detected     Bordetella pertussis pcr Not Detected     Bordetella parapertussis PCR Not Detected     Chlamydophila pneumoniae PCR Not Detected     Mycoplasma pneumo by PCR Detected    Narrative:      In the setting of a positive respiratory panel with a viral infection PLUS a negative procalcitonin without other underlying concern for bacterial infection, consider observing off antibiotics or discontinuation of antibiotics and continue supportive care. If the respiratory panel is positive for atypical bacterial infection (Bordetella pertussis, Chlamydophila pneumoniae, or Mycoplasma pneumoniae), consider antibiotic de-escalation to target atypical bacterial infection.    Comprehensive Metabolic Panel [[id number]]  (Abnormal) Collected: 09/22/24 2105    Specimen: Blood from Arm, Right Updated: 09/22/24 2145     Glucose 108 mg/dL      BUN 8 mg/dL      Creatinine 0.58 mg/dL      Sodium 138 mmol/L      Potassium 3.8 mmol/L      Chloride 100 mmol/L      CO2 25.6 mmol/L      Calcium 9.3 mg/dL      Total Protein 7.2 g/dL      Albumin 3.8 g/dL      ALT (SGPT) 76 U/L      AST (SGOT) 62 U/L      Alkaline Phosphatase 183 U/L      Total Bilirubin 0.6 mg/dL      Globulin 3.4 gm/dL      A/G Ratio 1.1 g/dL      BUN/Creatinine Ratio 13.8     Anion Gap 12.4 mmol/L      eGFR 102.5 mL/min/1.73     Narrative:      GFR Normal >60  Chronic Kidney Disease <60  Kidney Failure <15      High Sensitivity Troponin T [[id number]]  (Normal) Collected: 09/22/24 2105    Specimen: Blood from Arm, Right Updated: 09/22/24 2145     HS Troponin T <6 ng/L     Narrative:      High Sensitive Troponin T Reference Range:  <14.0 ng/L- Negative Female for AMI  <22.0 ng/L- Negative Male for AMI  >=14 - Abnormal Female indicating possible myocardial injury.  >=22 - Abnormal Male indicating possible myocardial injury.   Clinicians would have to utilize clinical acumen, EKG, Troponin, and serial  changes to determine if it is an Acute Myocardial Infarction or myocardial injury due to an underlying chronic condition.         BNP [[id number]]  (Normal) Collected: 09/22/24 2105    Specimen: Blood from Arm, Right Updated: 09/22/24 2145     proBNP 57.6 pg/mL     Narrative:      This assay is used as an aid in the diagnosis of individuals suspected of having heart failure. It can be used as an aid in the diagnosis of acute decompensated heart failure (ADHF) in patients presenting with signs and symptoms of ADHF to the emergency department (ED). In addition, NT-proBNP of <300 pg/mL indicates ADHF is not likely.    Age Range Result Interpretation  NT-proBNP Concentration (pg/mL:      <50             Positive            >450                   Gray                 300-450                    Negative             <300    50-75           Positive            >900                  Gray                300-900                  Negative            <300      >75             Positive            >1800                  Gray                300-1800                  Negative            <300    Blood Culture - Blood, Arm, Left [[id number]] Collected: 09/22/24 2124    Specimen: Blood from Arm, Left Updated: 09/22/24 2128    POC Lactate [[id number]]  (Normal) Collected: 09/22/24 2113    Specimen: Blood Updated: 09/22/24 2115     Lactate 0.5 mmol/L      Comment: Serial Number: [id number]  [id number]       CBC & Differential [[id number]]  (Abnormal) Collected: 09/22/24 2105    Specimen: Blood from Arm, Right Updated: 09/22/24 2112    Narrative:      The following orders were created for panel order CBC & Differential.  Procedure                               Abnormality         Status                     ---------                               -----------         ------                     CBC Auto Differential[[id number]]        Abnormal            Final result                 Please view results for these tests on the individual  orders.    CBC Auto Differential [[id number]]  (Abnormal) Collected: 09/22/24 2105    Specimen: Blood from Arm, Right Updated: 09/22/24 2112     WBC 13.12 10*3/mm3      RBC 4.23 10*6/mm3      Hemoglobin 13.0 g/dL      Hematocrit 39.3 %      MCV 92.9 fL      MCH 30.7 pg      MCHC 33.1 g/dL      RDW 13.5 %      RDW-SD 45.9 fl      MPV 9.6 fL      Platelets 256 10*3/mm3      Neutrophil % 81.4 %      Lymphocyte % 10.4 %      Monocyte % 6.7 %      Eosinophil % 0.9 %      Basophil % 0.2 %      Immature Grans % 0.4 %      Neutrophils, Absolute 10.67 10*3/mm3      Lymphocytes, Absolute 1.37 10*3/mm3      Monocytes, Absolute 0.88 10*3/mm3      Eosinophils, Absolute 0.12 10*3/mm3      Basophils, Absolute 0.03 10*3/mm3      Immature Grans, Absolute 0.05 10*3/mm3      nRBC 0.0 /100 WBC     Blood Culture - Blood, Arm, Right [[id number]] Collected: 09/22/24 2105    Specimen: Blood from Arm, Right Updated: 09/22/24 2110             I reviewed the patient's new clinical results.    Assessment & Plan     Acute respiratory distress  Bronchospasm  Pneumonia of both lungs due to mycoplasma pneumonia  -EKG normal sinus rhythm  -ALT 76, AST 62, alk phos 183  -troponin negative x  -CXR shows RLLL infiltrate  -CT chest with no PE but does confirm pneumonia  -Doxycycline and rocephin  -Respiratory panel positive for mycoplasma.   -oxygen prn    DVT prophylaxis- SCD's  GI prophylaxis- ppi    I discussed the patient's findings and my recommendations with patient.     Bhavya Mathias, APRN  09/23/24  04:46 EDT

## 2024-09-23 NOTE — PLAN OF CARE
Goal Outcome Evaluation:  Plan of Care Reviewed With: patient        Progress: no change  Outcome Evaluation: Patient alert and orientred, continues on room air, continues to complain of non productive cough, patient up to Bathroom, SBA, patient has rested well this shift. Call light within reach. VSS.

## 2024-09-23 NOTE — CASE MANAGEMENT/SOCIAL WORK
Discharge Planning Assessment   Luca     Patient Name: Kelly Garcia  MRN: [id number]  Today's Date: 9/23/2024    Admit Date: 9/22/2024    Plan: Anticipate routine home with daughters. Watch O2 needs.   Discharge Needs Assessment       Row Name 09/23/24 1426       Living Environment    People in Home child(aby), adult    Current Living Arrangements home    Potentially Unsafe Housing Conditions none    In the past 12 months has the electric, gas, oil, or water company threatened to shut off services in your home? No    Primary Care Provided by self    Provides Primary Care For no one    Family Caregiver if Needed sibling(s)    Family Caregiver Names Brother- Yury, daughters    Quality of Family Relationships involved;supportive;helpful    Able to Return to Prior Arrangements yes       Resource/Environmental Concerns    Resource/Environmental Concerns none    Transportation Concerns none       Transportation Needs    In the past 12 months, has lack of transportation kept you from medical appointments or from getting medications? no    In the past 12 months, has lack of transportation kept you from meetings, work, or from getting things needed for daily living? No       Food Insecurity    Within the past 12 months, you worried that your food would run out before you got the money to buy more. Never true    Within the past 12 months, the food you bought just didn't last and you didn't have money to get more. Never true       Transition Planning    Patient/Family Anticipates Transition to home with family    Patient/Family Anticipated Services at Transition none    Transportation Anticipated family or friend will provide       Discharge Needs Assessment    Readmission Within the Last 30 Days no previous admission in last 30 days    Equipment Currently Used at Home none    Concerns to be Addressed care coordination/care conferences;discharge planning    Anticipated Changes Related to Illness none    Equipment Needed  After Discharge other (see comments)  Watch O2 needs                   Discharge Plan       Row Name 09/23/24 1425       Plan    Plan Anticipate routine home with daughters. Watch O2 needs.    Patient/Family in Agreement with Plan yes    Provided Post Acute Provider List? N/A    Plan Comments CM met with patient at bedside to discuss dc planning. PCP and pharmacy confirmed, reported no trouble affording food/medications, currently enrolled in [facility] meds to bed, and declined needs at this time for any DME/HH/PT services. Confirmed patient did not have O2 at home at this time. Currently requiring 2L O2. May require walking oximetry to be performed 24-48 hrs prior to d/c to determine O2 needs.                     Demographic Summary       Row Name 09/23/24 1425       General Information    Admission Type inpatient    Arrived From emergency department    Referral Source admission list    Reason for Consult discharge planning    Preferred Language English       Contact Information    Permission Granted to Share Info With                    Functional Status       Row Name 09/23/24 1426       Functional Status    Usual Activity Tolerance good    Current Activity Tolerance good       Functional Status, IADL    Medications independent    Meal Preparation independent    Housekeeping independent    Laundry independent    Shopping independent             Paloma Matamoros RN     Office Phone: [phone]  Office Cell: [phone]

## 2024-09-23 NOTE — NURSING NOTE
Patient admitted from ED, Bhavya Mathias NP notified, orders placed.  Patient on room air, denies pain, complain of non productive cough.  Call light within reach.

## 2024-09-23 NOTE — ED PROVIDER NOTES
Subjective   History of Present Illness  Chief complaint Short of breath cough    History of present illness is a 64-year-old female who recently went to [place] who now complains of cough dry throat and increasing shortness of breath she had been taken over-the-counter medicine without relief she stopped doing it 4 days ago.  She has had no real high fever or chills.  No ill exposures.  No foreign travels no leg pain or swelling no chest pain neck arm jaw pain some tightness.  No urinary complaints.  Symptoms have become severe she states she cannot walk very far before she has to stop because he show short of breath.  Cough is been nonproductive.      Review of Systems   Constitutional:  Negative for chills and fever.   HENT:  Positive for congestion.    Respiratory:  Positive for cough, chest tightness and shortness of breath.    Cardiovascular:  Negative for chest pain and palpitations.   Gastrointestinal:  Negative for abdominal pain and vomiting.   Genitourinary:  Negative for difficulty urinating and dysuria.   Musculoskeletal:  Negative for back pain.   Skin:  Negative for rash.   Neurological:  Negative for dizziness and light-headedness.   Psychiatric/Behavioral:  Negative for confusion.        Past Medical History:   Diagnosis Date    Family history of colon cancer     Screening for colon cancer 2020    Umbilical hernia     Varicose vein of leg        Allergies   Allergen Reactions    Penicillins Nausea And Vomiting and Shortness Of Breath    Morphine Nausea And Vomiting and Hallucinations       Past Surgical History:   Procedure Laterality Date    BREAST BIOPSY Left      SECTION      x2    CHOLECYSTECTOMY      COLONOSCOPY N/A 2020    Procedure: COLONOSCOPY WITH POLYPECTOMY X'S 4;  Surgeon: Sen Chirinos MD;  Location: [facility] ENDOSCOPY;  Service: Gastroenterology;  Laterality: N/A;  POLYPS, INTERNAL AND EXTERNAL HEMORRHOIDS    ERCP      HAND SURGERY      partial amputation  of right index finger and tendon repair left hand       Family History   Problem Relation Age of Onset    Colon cancer Father     Colon cancer Brother     Breast cancer Other     Breast cancer Sister     Leukemia Maternal Aunt     Lung cancer Maternal Uncle     Cancer Brother         Renal    Multiple myeloma Paternal Uncle     Throat cancer Paternal Uncle        Social History     Socioeconomic History    Marital status: Single   Tobacco Use    Smoking status: Never    Smokeless tobacco: Never   Vaping Use    Vaping status: Never Used   Substance and Sexual Activity    Alcohol use: Yes     Comment: 2-3 drinks/week    Drug use: Never    Sexual activity: Defer     Prior to Admission medications    Medication Sig Start Date End Date Taking? Authorizing Provider   Diclofenac Sodium (VOLTAREN) 1 % gel gel apply 2 gram by topical route 4 times every day to the affected area(s) 2/3/21      diclofenac sodium (VOTAREN XR) 100 MG 24 hr tablet TAKE 1 TABLET BY MOUTH EVERY DAY 11/21/23      hydrOXYzine (ATARAX) 25 MG tablet take 1 tablet by oral route 30-60 minutes before bedtime as needed for anxiety 11/21/23      latanoprost (XALATAN) 0.005 % ophthalmic solution Instill 1 drop into each eye at bedtime 3/15/23      omeprazole (priLOSEC) 40 MG capsule Take 1 capsule by mouth Daily before a meal. 2/3/21      sertraline (Zoloft) 25 MG tablet take 1 tablet by oral route  every day 7/8/24               Objective   Physical Exam  Constitutional 64-year-old female awake alert moderate distress temperature 99 oxygen sats 94% room air.  Triage vital signs reviewed.  HEENT extraocular muscles intact pupils equal round reactive mouth clear no exudate abscess stridor drooling neck supple no adenopathy no meningeal signs no JVD no bruits lungs some scattered wheezes and rhonchi and rales throughout but no retraction no use of accessories.  Heart regular without murmur or rub.  Abdomen soft nontender good bowel sounds no masses extremities  pulses equal throughout upper and lower extremities no edema no cords no Homans' sign no evidence of DVT skin is warm and dry no cellulitic changes neurologic awake alert and follows commands motor strength normal without focal weakness.  Procedures           ED Course      Results for orders placed or performed during the hospital encounter of 09/22/24   Respiratory Panel PCR w/COVID-19(SARS-CoV-2) BUTCH/BERNADETTE/MARIEL/PAD/[facility]/BRIDGET In-House, NP Swab in UTM/VTM, 2 HR TAT - Swab, Nasopharynx    Specimen: Nasopharynx; Swab   Result Value Ref Range    ADENOVIRUS, PCR Not Detected Not Detected    Coronavirus 229E Not Detected Not Detected    Coronavirus HKU1 Not Detected Not Detected    Coronavirus NL63 Not Detected Not Detected    Coronavirus OC43 Not Detected Not Detected    COVID19 Not Detected Not Detected - Ref. Range    Human Metapneumovirus Not Detected Not Detected    Human Rhinovirus/Enterovirus Not Detected Not Detected    Influenza A PCR Not Detected Not Detected    Influenza B PCR Not Detected Not Detected    Parainfluenza Virus 1 Not Detected Not Detected    Parainfluenza Virus 2 Not Detected Not Detected    Parainfluenza Virus 3 Not Detected Not Detected    Parainfluenza Virus 4 Not Detected Not Detected    RSV, PCR Not Detected Not Detected    Bordetella pertussis pcr Not Detected Not Detected    Bordetella parapertussis PCR Not Detected Not Detected    Chlamydophila pneumoniae PCR Not Detected Not Detected    Mycoplasma pneumo by PCR Detected (A) Not Detected   Comprehensive Metabolic Panel    Specimen: Arm, Right; Blood   Result Value Ref Range    Glucose 108 (H) 65 - 99 mg/dL    BUN 8 8 - 23 mg/dL    Creatinine 0.58 0.57 - 1.00 mg/dL    Sodium 138 136 - 145 mmol/L    Potassium 3.8 3.5 - 5.2 mmol/L    Chloride 100 98 - 107 mmol/L    CO2 25.6 22.0 - 29.0 mmol/L    Calcium 9.3 8.6 - 10.5 mg/dL    Total Protein 7.2 6.0 - 8.5 g/dL    Albumin 3.8 3.5 - 5.2 g/dL    ALT (SGPT) 76 (H) 1 - 33 U/L    AST (SGOT) 62 (H) 1 -  32 U/L    Alkaline Phosphatase 183 (H) 39 - 117 U/L    Total Bilirubin 0.6 0.0 - 1.2 mg/dL    Globulin 3.4 gm/dL    A/G Ratio 1.1 g/dL    BUN/Creatinine Ratio 13.8 7.0 - 25.0    Anion Gap 12.4 5.0 - 15.0 mmol/L    eGFR 102.5 >60.0 mL/min/1.73   High Sensitivity Troponin T    Specimen: Arm, Right; Blood   Result Value Ref Range    HS Troponin T <6 <14 ng/L   BNP    Specimen: Arm, Right; Blood   Result Value Ref Range    proBNP 57.6 0.0 - 900.0 pg/mL   CBC Auto Differential    Specimen: Arm, Right; Blood   Result Value Ref Range    WBC 13.12 (H) 3.40 - 10.80 10*3/mm3    RBC 4.23 3.77 - 5.28 10*6/mm3    Hemoglobin 13.0 12.0 - 15.9 g/dL    Hematocrit 39.3 34.0 - 46.6 %    MCV 92.9 79.0 - 97.0 fL    MCH 30.7 26.6 - 33.0 pg    MCHC 33.1 31.5 - 35.7 g/dL    RDW 13.5 12.3 - 15.4 %    RDW-SD 45.9 37.0 - 54.0 fl    MPV 9.6 6.0 - 12.0 fL    Platelets 256 140 - 450 10*3/mm3    Neutrophil % 81.4 (H) 42.7 - 76.0 %    Lymphocyte % 10.4 (L) 19.6 - 45.3 %    Monocyte % 6.7 5.0 - 12.0 %    Eosinophil % 0.9 0.3 - 6.2 %    Basophil % 0.2 0.0 - 1.5 %    Immature Grans % 0.4 0.0 - 0.5 %    Neutrophils, Absolute 10.67 (H) 1.70 - 7.00 10*3/mm3    Lymphocytes, Absolute 1.37 0.70 - 3.10 10*3/mm3    Monocytes, Absolute 0.88 0.10 - 0.90 10*3/mm3    Eosinophils, Absolute 0.12 0.00 - 0.40 10*3/mm3    Basophils, Absolute 0.03 0.00 - 0.20 10*3/mm3    Immature Grans, Absolute 0.05 0.00 - 0.05 10*3/mm3    nRBC 0.0 0.0 - 0.2 /100 WBC   High Sensitivity Troponin T 2Hr    Specimen: Arm, Left; Blood   Result Value Ref Range    HS Troponin T <6 <14 ng/L    Troponin T Delta     POC Lactate    Specimen: Blood   Result Value Ref Range    Lactate 0.5 0.3 - 2.0 mmol/L   ECG 12 Lead Dyspnea   Result Value Ref Range    QT Interval 324 ms    QTC Interval 424 ms     CT Angiogram Chest Pulmonary Embolism    Result Date: 9/22/2024  Impression: 1.Diffuse bilateral tree-in-bud airspace opacities consistent with pneumonia. 2.No evidence of pulmonary embolus.  Electronically Signed: Gianfranco Pandya MD  9/22/2024 11:57 PM EDT  Workstation ID: [id number]    XR Chest 1 View    Result Date: 9/22/2024  Impression: Right perihilar and right lower lobe pneumonia. Electronically Signed: Gianfranco Pandya MD  9/22/2024 8:55 PM EDT  Workstation ID: [id number]   Medications   sodium chloride 0.9 % flush 10 mL (has no administration in time range)   ipratropium-albuterol (DUO-NEB) nebulizer solution 3 mL (3 mL Nebulization Given 9/22/24 2044)   cefTRIAXone (ROCEPHIN) 2,000 mg in sodium chloride 0.9 % 100 mL MBP (0 mg Intravenous Stopped 9/22/24 2200)   doxycycline (VIBRAMYCIN) 100 mg in sodium chloride 0.9 % 100 mL MBP (0 mg Intravenous Stopped 9/22/24 2348)   iopamidol (ISOVUE-370) 76 % injection 100 mL (100 mL Intravenous Given 9/22/24 2317)                                    EKG my interpretation normal sinus rhythm rate of 100 normal axis hypertrophy nonspecific T wave changes noted in the anterior leads abnormal EKG nothing old to compare with          Medical Decision Making  Decision made.  IV established monitor placement review of sinus rhythm oxygen sats about 91% on room air.  Given a DuoNeb here.  Patient had a EKG my interpretation normal sinus rhythm the 100 nonspecific T wave changes noted anteriorly nothing old to compare with but abnormal EKG.  Labs obtained by independent review of his metabolic profile unremarkable ALT of 76 AST of 62 alk phos of 183.  Troponins were negative CBC unremarkable white count 13.12 lactic at 0.5 blood cultures pending.  Patient's chest x-ray obtained my independent review shows a right lower lobe infiltrate.  Radiology review the same.  I do not see evidence of pneumothorax or failure.  In light of her long car ride recent travel to [place] concerns for pulmonary embolism without hypoxia or worry.  CT scan was obtained my independent review I do not see any pulmonary embolism but has bilateral pneumonia.  Radiology review diffuse bilateral  tree-in-bud airspace opacities consistent with pneumonia no PE.  Patient on repeat exam still) sats about 91% still with some scattered wheezing.  Cough.  I do not see any evidence that suggest sepsis I do not see any evidence that suggest acute myocardial infarction DVT pulmonary embolism or dissection pericarditis myocarditis pericardial effusions although not a complete list of all possibilities.  Patient has been started on Rocephin and doxycycline IV.  Respiratory panel returned positive for mycoplasma.  Patient made aware of the findings.  She remains her sats in the low 90s.  I talked to the nurse practitioner for Dr. Josue and the case was discussed and the patient will be admitted for further care to the hospital stable otherwise unremarkable ER course.    Problems Addressed:  Acute respiratory distress: complicated acute illness or injury  Bronchospasm: complicated acute illness or injury  Pneumonia of both lungs due to infectious organism, unspecified part of lung: complicated acute illness or injury  Pneumonia of both lungs due to Mycoplasma pneumoniae, unspecified part of lung: complicated acute illness or injury    Amount and/or Complexity of Data Reviewed  Labs: ordered. Decision-making details documented in ED Course.  Radiology: ordered and independent interpretation performed. Decision-making details documented in ED Course.  ECG/medicine tests: ordered and independent interpretation performed. Decision-making details documented in ED Course.    Risk  Decision regarding hospitalization.        Final diagnoses:   Acute respiratory distress   Bronchospasm   Pneumonia of both lungs due to infectious organism, unspecified part of lung   Pneumonia of both lungs due to Mycoplasma pneumoniae, unspecified part of lung       ED Disposition  ED Disposition       ED Disposition   Decision to Admit    Condition   --    Comment   Level of Care: Telemetry [5]   Admitting Physician: JEWEL JOSUE [[id number]    Attending Physician: JEWEL JOSUE [[id number]]                 No follow-up provider specified.       Medication List      No changes were made to your prescriptions during this visit.            Travis Olmstead MD  09/23/24 0049

## 2024-09-23 NOTE — PAYOR COMM NOTE
"Inpatient order 9/23/24    ER admit on 9/22/24  MD notes and clinical attached.     -------------  AUTHORIZATION PENDING:   PLEASE FAX OR CALL DETERMINATION TO CONTACT BELOW:       THANK YOU,    SONIA GutierrezN, RN  Utilization Review  [facility]  Phone: [phone]  Fax: [phone]      NPI: [id number]  Tax ID: [id number]        Amari Garcia (64 y.o. Female)       Date of Birth   1960    Social Security Number       Address   [address] [facility] [place]    Home Phone   [phone]    MRN   [id number]       [facility]    Marital Status   Single                            Admission Date   9/22/24    Admission Type   Emergency    Admitting Provider   Tash Dias MD    Attending Provider   Tash Dias MD    Department, Room/Bed   [facility] PROGRESS CARE, 2129/1       Discharge Date       Discharge Disposition       Discharge Destination                                 Attending Provider: Tash Dias MD    Allergies: Penicillins, Morphine    Isolation: Droplet   Infection: Mycoplasma pneumonia (09/22/24)   Code Status: CPR    Ht: 162.6 cm (64\")   Wt: 116 kg (254 lb 13.6 oz)    Admission Cmt: None   Principal Problem: Pneumonia of right lower lobe due to infectious organism [J18.9]                   Active Insurance as of 9/22/2024       Primary Coverage       Payor Plan Insurance Group Employer/Plan Group    [facility] BL[employer] [facility] EMPLOYEE [id number]       Payor Plan Address Payor Plan Phone Number Payor Plan Fax Number Effective Dates    [address] [phone]  1/1/2020 - None Entered    [facility] [id number]         Subscriber Name Subscriber Birth Date Member ID       AMARI GARCIA 1960 [id number]                     Emergency Contacts        (Rel.) Home Phone Work Phone Mobile Phone    JOSIE GARSIA (Brother) -- -- [phone]                 History & Physical        Bhavya Mathias, APRN at 09/23/24 0321            " See below    Patient Care Team:  Loida Valente MD as PCP - General (Family Medicine)  Cielo Birmingham MD as Consulting Physician (Interventional Cardiology)    Chief complaint shortness of breath, cough    Subjective    Patient is a 64 y.o. female who presented to the ER with complaints of shortness of breath and cough over the last week that has increasingly gotten worse. She recently went to [place] and started with symptoms when home She had been using over the counter cough medicine without relief so she stopped taking them 4 days go. She denies fever, chills, nausea, vomiting, diarrhea, chest pain, dizziness, syncope.   ER course: EKG normal sinus rhythm, ALT 76, AST 62, alk phos 183, troponin negative x2. CXR shows RLLL infiltrate. CT chest with no PE but does confirm pneumonia. Patient remained on room air with sat's 90-91%. She was started on doxycycline and rocephin. Respiratory panel positive for mycoplasma.     Onset of symptoms was 1 week    Review of Systems   Constitutional: Negative.    HENT: Negative.     Eyes: Negative.    Respiratory:  Positive for cough and shortness of breath.    Cardiovascular: Negative.    Gastrointestinal: Negative.    Endocrine: Negative.    Genitourinary: Negative.    Musculoskeletal: Negative.    Skin: Negative.    Neurological: Negative.    Psychiatric/Behavioral: Negative.            History  Past Medical History:   Diagnosis Date    Family history of colon cancer     Screening for colon cancer 2020    Umbilical hernia     Varicose vein of leg      Past Surgical History:   Procedure Laterality Date    BREAST BIOPSY Left      SECTION      x2    CHOLECYSTECTOMY      COLONOSCOPY N/A 2020    Procedure: COLONOSCOPY WITH POLYPECTOMY X'S 4;  Surgeon: Sen Chirinos MD;  Location: [facility] ENDOSCOPY;  Service: Gastroenterology;  Laterality: N/A;  POLYPS, INTERNAL AND EXTERNAL HEMORRHOIDS    ERCP      HAND SURGERY      partial amputation of right index  finger and tendon repair left hand     Family History   Problem Relation Age of Onset    Colon cancer Father     Colon cancer Brother     Breast cancer Other     Breast cancer Sister     Leukemia Maternal Aunt     Lung cancer Maternal Uncle     Cancer Brother         Renal    Multiple myeloma Paternal Uncle     Throat cancer Paternal Uncle      Social History     Tobacco Use    Smoking status: Never    Smokeless tobacco: Never   Vaping Use    Vaping status: Never Used   Substance Use Topics    Alcohol use: Yes     Comment: 2-3 drinks/week    Drug use: Never     Medications Prior to Admission   Medication Sig Dispense Refill Last Dose    diclofenac sodium (VOTAREN XR) 100 MG 24 hr tablet TAKE 1 TABLET BY MOUTH EVERY DAY (Patient taking differently: Take 1 tablet by mouth Daily As Needed.) 30 tablet 12 Past Week    sertraline (Zoloft) 25 MG tablet take 1 tablet by oral route  every day (Patient not taking: Reported on 9/23/2024) 30 tablet 1 Unknown     Allergies:  Penicillins and Morphine    Objective    Vital Signs  Temp:  [99.1 °F (37.3 °C)-99.9 °F (37.7 °C)] 99.1 °F (37.3 °C)  Heart Rate:  [] 97  Resp:  [18-34] 34  BP: (112-147)/(55-81) 130/65     Physical Exam:      General Appearance:    Alert, cooperative, in no acute distress   Head:    Normocephalic, without obvious abnormality, atraumatic   Eyes:            Lids and lashes normal, conjunctivae and sclerae normal, no   icterus, no pallor, corneas clear, PERRLA   Ears:    Ears appear intact with no abnormalities noted   Throat:   No oral lesions, no thrush, oral mucosa moist   Neck:   No adenopathy, supple, trachea midline, no thyromegaly, no   carotid bruit, no JVD   Lungs:     Mild wheezing, rhonchi present bilaterally,respirations regular, even and                  unlabored    Heart:    Regular rhythm and normal rate, normal S1 and S2, no            murmur, no gallop, no rub, no click   Chest Wall:    No abnormalities observed   Abdomen:     Normal  bowel sounds, no masses, no organomegaly, soft        non-tender, non-distended, no guarding, no rebound                tenderness   Extremities:   Moves all extremities well, no edema, no cyanosis, no             redness   Pulses:   Pulses palpable and equal bilaterally   Skin:   No bleeding, bruising or rash   Lymph nodes:   No palpable adenopathy   Neurologic:   No focal deficits noted       Results Review:     Imaging Results (Last 24 Hours)       Procedure Component Value Units Date/Time    CT Angiogram Chest Pulmonary Embolism [[id number]] Collected: 09/22/24 2353     Updated: 09/22/24 2359    Narrative:      CT ANGIOGRAM CHEST PULMONARY EMBOLISM    Date of Exam: 9/22/2024 11:16 PM EDT    Indication: Short of breath recent travel to [place].    Comparison: None available.    Technique: Axial CT images were obtained of the chest after the uneventful intravenous administration of iodinated contrast utilizing pulmonary embolism protocol.  Sagittal and coronal reconstructions were performed.  Automated exposure control and   iterative reconstruction methods were used.      Findings:  Hilum and Mediastinum: No pathologically enlarged lymph nodes.  Normal heart size.   No pericardial effusion.  Unremarkable thoracic aorta and pulmonary arteries.    Lung Parenchyma and Pleura: There are diffuse bilateral tree-in-bud airspace opacities greatest in the right lower lobe but seen diffusely throughout both lungs. These findings are consistent with diffuse pneumonia, most likely atypical pneumonia.    Upper Abdomen: There are clips from cholecystectomy. There is mild fatty infiltration of the liver.    Soft tissues: Unremarkable.    Osseous structures: No aggressive focal lytic or sclerotic osseous lesions.      Impression:      Impression:  1.Diffuse bilateral tree-in-bud airspace opacities consistent with pneumonia.  2.No evidence of pulmonary embolus.        Electronically Signed: Gianfranco Pandya MD    9/22/2024 11:57 PM  EDT    Workstation ID: [id number]    XR Chest 1 View [[id number]] Collected: 09/22/24 2054     Updated: 09/22/24 2057    Narrative:      XR CHEST 1 VW    Date of Exam: 9/22/2024 8:50 PM EDT    Indication: Short of breath and cough    Comparison: None available.    Findings:  The heart size and pulmonary vascular markings are normal. The left lung is clear. There is right perihilar and right lower lobe airspace disease consistent with pneumonia.      Impression:      Impression:  Right perihilar and right lower lobe pneumonia.        Electronically Signed: Gianfranco Pandya MD    9/22/2024 8:55 PM EDT    Workstation ID: [id number]             Lab Results (last 24 hours)       Procedure Component Value Units Date/Time    Basic Metabolic Panel [[id number]]  (Abnormal) Collected: 09/23/24 0319    Specimen: Blood from Arm, Left Updated: 09/23/24 0356     Glucose 105 mg/dL      BUN 7 mg/dL      Creatinine 0.64 mg/dL      Sodium 139 mmol/L      Potassium 3.9 mmol/L      Chloride 101 mmol/L      CO2 27.5 mmol/L      Calcium 9.0 mg/dL      BUN/Creatinine Ratio 10.9     Anion Gap 10.5 mmol/L      eGFR 100.1 mL/min/1.73     Narrative:      GFR Normal >60  Chronic Kidney Disease <60  Kidney Failure <15      CBC & Differential [[id number]]  (Abnormal) Collected: 09/23/24 0319    Specimen: Blood from Arm, Left Updated: 09/23/24 0328    Narrative:      The following orders were created for panel order CBC & Differential.  Procedure                               Abnormality         Status                     ---------                               -----------         ------                     CBC Auto Differential[[id number]]        Abnormal            Final result                 Please view results for these tests on the individual orders.    CBC Auto Differential [[id number]]  (Abnormal) Collected: 09/23/24 0319    Specimen: Blood from Arm, Left Updated: 09/23/24 0328     WBC 11.26 10*3/mm3      RBC 3.97 10*6/mm3      Hemoglobin 12.3 g/dL       Hematocrit 37.0 %      MCV 93.2 fL      MCH 31.0 pg      MCHC 33.2 g/dL      RDW 13.6 %      RDW-SD 46.6 fl      MPV 9.6 fL      Platelets 249 10*3/mm3      Neutrophil % 77.9 %      Lymphocyte % 12.6 %      Monocyte % 8.1 %      Eosinophil % 0.7 %      Basophil % 0.2 %      Immature Grans % 0.5 %      Neutrophils, Absolute 8.77 10*3/mm3      Lymphocytes, Absolute 1.42 10*3/mm3      Monocytes, Absolute 0.91 10*3/mm3      Eosinophils, Absolute 0.08 10*3/mm3      Basophils, Absolute 0.02 10*3/mm3      Immature Grans, Absolute 0.06 10*3/mm3      nRBC 0.0 /100 WBC     High Sensitivity Troponin T 2Hr [[id number]] Collected: 09/22/24 2326    Specimen: Blood from Arm, Left Updated: 09/23/24 0003     HS Troponin T <6 ng/L      Troponin T Delta --     Comment: Unable to calculate.       Narrative:      High Sensitive Troponin T Reference Range:  <14.0 ng/L- Negative Female for AMI  <22.0 ng/L- Negative Male for AMI  >=14 - Abnormal Female indicating possible myocardial injury.  >=22 - Abnormal Male indicating possible myocardial injury.   Clinicians would have to utilize clinical acumen, EKG, Troponin, and serial changes to determine if it is an Acute Myocardial Infarction or myocardial injury due to an underlying chronic condition.         Respiratory Panel PCR w/COVID-19(SARS-CoV-2) BUTCH/BERNADETTE/MARIEL/PAD/COR/BRIDGET In-House, NP Swab in UTM/VTM, 2 HR TAT - Swab, Nasopharynx [[id number]]  (Abnormal) Collected: 09/22/24 2106    Specimen: Swab from Nasopharynx Updated: 09/22/24 2218     ADENOVIRUS, PCR Not Detected     Coronavirus 229E Not Detected     Coronavirus HKU1 Not Detected     Coronavirus NL63 Not Detected     Coronavirus OC43 Not Detected     COVID19 Not Detected     Human Metapneumovirus Not Detected     Human Rhinovirus/Enterovirus Not Detected     Influenza A PCR Not Detected     Influenza B PCR Not Detected     Parainfluenza Virus 1 Not Detected     Parainfluenza Virus 2 Not Detected     Parainfluenza Virus 3 Not  Detected     Parainfluenza Virus 4 Not Detected     RSV, PCR Not Detected     Bordetella pertussis pcr Not Detected     Bordetella parapertussis PCR Not Detected     Chlamydophila pneumoniae PCR Not Detected     Mycoplasma pneumo by PCR Detected    Narrative:      In the setting of a positive respiratory panel with a viral infection PLUS a negative procalcitonin without other underlying concern for bacterial infection, consider observing off antibiotics or discontinuation of antibiotics and continue supportive care. If the respiratory panel is positive for atypical bacterial infection (Bordetella pertussis, Chlamydophila pneumoniae, or Mycoplasma pneumoniae), consider antibiotic de-escalation to target atypical bacterial infection.    Comprehensive Metabolic Panel [[id number]]  (Abnormal) Collected: 09/22/24 2105    Specimen: Blood from Arm, Right Updated: 09/22/24 2145     Glucose 108 mg/dL      BUN 8 mg/dL      Creatinine 0.58 mg/dL      Sodium 138 mmol/L      Potassium 3.8 mmol/L      Chloride 100 mmol/L      CO2 25.6 mmol/L      Calcium 9.3 mg/dL      Total Protein 7.2 g/dL      Albumin 3.8 g/dL      ALT (SGPT) 76 U/L      AST (SGOT) 62 U/L      Alkaline Phosphatase 183 U/L      Total Bilirubin 0.6 mg/dL      Globulin 3.4 gm/dL      A/G Ratio 1.1 g/dL      BUN/Creatinine Ratio 13.8     Anion Gap 12.4 mmol/L      eGFR 102.5 mL/min/1.73     Narrative:      GFR Normal >60  Chronic Kidney Disease <60  Kidney Failure <15      High Sensitivity Troponin T [[id number]]  (Normal) Collected: 09/22/24 2105    Specimen: Blood from Arm, Right Updated: 09/22/24 2145     HS Troponin T <6 ng/L     Narrative:      High Sensitive Troponin T Reference Range:  <14.0 ng/L- Negative Female for AMI  <22.0 ng/L- Negative Male for AMI  >=14 - Abnormal Female indicating possible myocardial injury.  >=22 - Abnormal Male indicating possible myocardial injury.   Clinicians would have to utilize clinical acumen, EKG, Troponin, and serial  changes to determine if it is an Acute Myocardial Infarction or myocardial injury due to an underlying chronic condition.         BNP [[id number]]  (Normal) Collected: 09/22/24 2105    Specimen: Blood from Arm, Right Updated: 09/22/24 2145     proBNP 57.6 pg/mL     Narrative:      This assay is used as an aid in the diagnosis of individuals suspected of having heart failure. It can be used as an aid in the diagnosis of acute decompensated heart failure (ADHF) in patients presenting with signs and symptoms of ADHF to the emergency department (ED). In addition, NT-proBNP of <300 pg/mL indicates ADHF is not likely.    Age Range Result Interpretation  NT-proBNP Concentration (pg/mL:      <50             Positive            >450                   Gray                 300-450                    Negative             <300    50-75           Positive            >900                  Gray                300-900                  Negative            <300      >75             Positive            >1800                  Gray                300-1800                  Negative            <300    Blood Culture - Blood, Arm, Left [[id number]] Collected: 09/22/24 2124    Specimen: Blood from Arm, Left Updated: 09/22/24 2128    POC Lactate [[id number]]  (Normal) Collected: 09/22/24 2113    Specimen: Blood Updated: 09/22/24 2115     Lactate 0.5 mmol/L      Comment: Serial Number: [id number]:  [id number]       CBC & Differential [[id number]]  (Abnormal) Collected: 09/22/24 2105    Specimen: Blood from Arm, Right Updated: 09/22/24 2112    Narrative:      The following orders were created for panel order CBC & Differential.  Procedure                               Abnormality         Status                     ---------                               -----------         ------                     CBC Auto Differential[[id number]]        Abnormal            Final result                 Please view results for these tests on the individual  orders.    CBC Auto Differential [[id number]]  (Abnormal) Collected: 09/22/24 2105    Specimen: Blood from Arm, Right Updated: 09/22/24 2112     WBC 13.12 10*3/mm3      RBC 4.23 10*6/mm3      Hemoglobin 13.0 g/dL      Hematocrit 39.3 %      MCV 92.9 fL      MCH 30.7 pg      MCHC 33.1 g/dL      RDW 13.5 %      RDW-SD 45.9 fl      MPV 9.6 fL      Platelets 256 10*3/mm3      Neutrophil % 81.4 %      Lymphocyte % 10.4 %      Monocyte % 6.7 %      Eosinophil % 0.9 %      Basophil % 0.2 %      Immature Grans % 0.4 %      Neutrophils, Absolute 10.67 10*3/mm3      Lymphocytes, Absolute 1.37 10*3/mm3      Monocytes, Absolute 0.88 10*3/mm3      Eosinophils, Absolute 0.12 10*3/mm3      Basophils, Absolute 0.03 10*3/mm3      Immature Grans, Absolute 0.05 10*3/mm3      nRBC 0.0 /100 WBC     Blood Culture - Blood, Arm, Right [[id number]] Collected: 09/22/24 2105    Specimen: Blood from Arm, Right Updated: 09/22/24 2110             I reviewed the patient's new clinical results.    Assessment & Plan    Acute respiratory distress  Bronchospasm  Pneumonia of both lungs due to mycoplasma pneumonia  -EKG normal sinus rhythm  -ALT 76, AST 62, alk phos 183  -troponin negative x  -CXR shows RLLL infiltrate  -CT chest with no PE but does confirm pneumonia  -Doxycycline and rocephin  -Respiratory panel positive for mycoplasma.   -oxygen prn    DVT prophylaxis- SCD's  GI prophylaxis- ppi    I discussed the patient's findings and my recommendations with patient.     KATINA Borges  09/23/24  04:46 EDT          Electronically signed by Bhavya Mathias APRN at 09/23/24 0523          Emergency Department Notes        Alvaro Saldana, RN at 09/23/24 0122          Nursing report ED to floor  Kelly Rodriguezsch  64 y.o.  female    HPI:   Chief Complaint   Patient presents with    Shortness of Breath       Admitting doctor:   Tash Dias MD    Admitting diagnosis:   The primary encounter diagnosis was Acute respiratory distress. Diagnoses of  Bronchospasm, Pneumonia of both lungs due to infectious organism, unspecified part of lung, and Pneumonia of both lungs due to Mycoplasma pneumoniae, unspecified part of lung were also pertinent to this visit.    Code status:   Current Code Status       Date Active Code Status Order ID Comments User Context       Not on file            Allergies:   Penicillins and Morphine    Isolation:  Droplet     Fall Risk:  Fall Risk Assessment was completed, and patient is at moderate risk for falls.   Predictive Model Details         12 (Low) Factor Value    Calculated 9/23/2024 01:20 Age 64    Risk of Fall Model Active Peripheral IV Present     Imaging order in this encounter Present     Respiratory Rate 28     Number of Distinct Medication Classes administered 5     Magnesium not on file     Pop Scale not on file     Diastolic BP 79     Chloride 100 mmol/L     Albumin 3.8 g/dL     ALT 76 U/L     Total Bilirubin 0.6 mg/dL     Creatinine 0.58 mg/dL     Calcium 9.3 mg/dL     Days after Admission 0.201     Potassium 3.8 mmol/L         Weight:       09/22/24 2029   Weight: 115 kg (253 lb 3.2 oz)       Intake and Output    Intake/Output Summary (Last 24 hours) at 9/23/2024 0122  Last data filed at 9/22/2024 2348  Gross per 24 hour   Intake 100 ml   Output --   Net 100 ml       Diet:        Most recent vitals:   Vitals:    09/22/24 2232 09/22/24 2247 09/23/24 0002 09/23/24 0102   BP: 128/67 134/79 114/55 112/66   BP Location:       Patient Position:       Pulse: 103 99 103 103   Resp:  28     Temp:       TempSrc:       SpO2: 90% 91% 91% 90%   Weight:       Height:           Active LDAs/IV Access:   Lines, Drains & Airways       Active LDAs       Name Placement date Placement time Site Days    Peripheral IV 12/22/21 1400 Right Antecubital 12/22/21  1400  Antecubital  1005    Peripheral IV 09/22/24 2103 Right Antecubital 09/22/24 2103  Antecubital  less than 1                    Skin Condition:   Skin Assessments (last day)        None             Labs (abnormal labs have a star):   Labs Reviewed   RESPIRATORY PANEL PCR W/ COVID-19 (SARS-COV-2), NP SWAB IN UTM/VTP, 2 HR TAT - Abnormal; Notable for the following components:       Result Value    Mycoplasma pneumo by PCR Detected (*)     All other components within normal limits    Narrative:     In the setting of a positive respiratory panel with a viral infection PLUS a negative procalcitonin without other underlying concern for bacterial infection, consider observing off antibiotics or discontinuation of antibiotics and continue supportive care. If the respiratory panel is positive for atypical bacterial infection (Bordetella pertussis, Chlamydophila pneumoniae, or Mycoplasma pneumoniae), consider antibiotic de-escalation to target atypical bacterial infection.   COMPREHENSIVE METABOLIC PANEL - Abnormal; Notable for the following components:    Glucose 108 (*)     ALT (SGPT) 76 (*)     AST (SGOT) 62 (*)     Alkaline Phosphatase 183 (*)     All other components within normal limits    Narrative:     GFR Normal >60  Chronic Kidney Disease <60  Kidney Failure <15     CBC WITH AUTO DIFFERENTIAL - Abnormal; Notable for the following components:    WBC 13.12 (*)     Neutrophil % 81.4 (*)     Lymphocyte % 10.4 (*)     Neutrophils, Absolute 10.67 (*)     All other components within normal limits   TROPONIN - Normal    Narrative:     High Sensitive Troponin T Reference Range:  <14.0 ng/L- Negative Female for AMI  <22.0 ng/L- Negative Male for AMI  >=14 - Abnormal Female indicating possible myocardial injury.  >=22 - Abnormal Male indicating possible myocardial injury.   Clinicians would have to utilize clinical acumen, EKG, Troponin, and serial changes to determine if it is an Acute Myocardial Infarction or myocardial injury due to an underlying chronic condition.        BNP (IN-HOUSE) - Normal    Narrative:     This assay is used as an aid in the diagnosis of individuals suspected of having  heart failure. It can be used as an aid in the diagnosis of acute decompensated heart failure (ADHF) in patients presenting with signs and symptoms of ADHF to the emergency department (ED). In addition, NT-proBNP of <300 pg/mL indicates ADHF is not likely.    Age Range Result Interpretation  NT-proBNP Concentration (pg/mL:      <50             Positive            >450                   Gray                 300-450                    Negative             <300    50-75           Positive            >900                  Gray                300-900                  Negative            <300      >75             Positive            >1800                  Gray                300-1800                  Negative            <300   POC LACTATE - Normal   BLOOD CULTURE   BLOOD CULTURE   HIGH SENSITIVITIY TROPONIN T 2HR    Narrative:     High Sensitive Troponin T Reference Range:  <14.0 ng/L- Negative Female for AMI  <22.0 ng/L- Negative Male for AMI  >=14 - Abnormal Female indicating possible myocardial injury.  >=22 - Abnormal Male indicating possible myocardial injury.   Clinicians would have to utilize clinical acumen, EKG, Troponin, and serial changes to determine if it is an Acute Myocardial Infarction or myocardial injury due to an underlying chronic condition.        POC LACTATE   CBC AND DIFFERENTIAL    Narrative:     The following orders were created for panel order CBC & Differential.  Procedure                               Abnormality         Status                     ---------                               -----------         ------                     CBC Auto Differential[[id number]]        Abnormal            Final result                 Please view results for these tests on the individual orders.       LOC: Person, Place, Time, and Situation    Telemetry:  Telemetry    Cardiac Monitoring Ordered: yes    EKG:   ECG 12 Lead Dyspnea   Preliminary Result   HEART XPBS=791  bpm   RR Ozmmlkyo=644  ms   RI  Sjbsuffe=830  ms   P Horizontal Axis=7  deg   P Front Axis=23  deg   QRSD Interval=83  ms   QT Jovpjqxa=405  ms   THsD=222  ms   QRS Axis=25  deg   T Wave Axis=  deg   - ABNORMAL ECG -   Sinus tachycardia   Low voltage, precordial leads   Nonspecific T abnrm, anterolateral leads   Date and Time of Study:2024-09-22 20:42:36      Telemetry Scan   Final Result      Telemetry Scan   Final Result          Medications Given in the ED:   Medications   sodium chloride 0.9 % flush 10 mL (has no administration in time range)   ipratropium-albuterol (DUO-NEB) nebulizer solution 3 mL (3 mL Nebulization Given 9/22/24 2044)   cefTRIAXone (ROCEPHIN) 2,000 mg in sodium chloride 0.9 % 100 mL MBP (0 mg Intravenous Stopped 9/22/24 2200)   doxycycline (VIBRAMYCIN) 100 mg in sodium chloride 0.9 % 100 mL MBP (0 mg Intravenous Stopped 9/22/24 2348)   iopamidol (ISOVUE-370) 76 % injection 100 mL (100 mL Intravenous Given 9/22/24 2317)       Imaging results:  CT Angiogram Chest Pulmonary Embolism    Result Date: 9/22/2024  Impression: 1.Diffuse bilateral tree-in-bud airspace opacities consistent with pneumonia. 2.No evidence of pulmonary embolus. Electronically Signed: Gianfranco Pandya MD  9/22/2024 11:57 PM EDT  Workstation ID: [id number]    XR Chest 1 View    Result Date: 9/22/2024  Impression: Right perihilar and right lower lobe pneumonia. Electronically Signed: Gianfranco Pandya MD  9/22/2024 8:55 PM EDT  Workstation ID: [id number]     Social issues:   Social History     Socioeconomic History    Marital status: Single   Tobacco Use    Smoking status: Never    Smokeless tobacco: Never   Vaping Use    Vaping status: Never Used   Substance and Sexual Activity    Alcohol use: Yes     Comment: 2-3 drinks/week    Drug use: Never    Sexual activity: Defer       NIH Stroke Scale:  Interval: (not recorded)  1a. Level of Consciousness: (not recorded)  1b. LOC Questions: (not recorded)  1c. LOC Commands: (not recorded)  2. Best Gaze: (not recorded)  3.  Visual: (not recorded)  4. Facial Palsy: (not recorded)  5a. Motor Arm, Left: (not recorded)  5b. Motor Arm, Right: (not recorded)  6a. Motor Leg, Left: (not recorded)  6b. Motor Leg, Right: (not recorded)  7. Limb Ataxia: (not recorded)  8. Sensory: (not recorded)  9. Best Language: (not recorded)  10. Dysarthria: (not recorded)  11. Extinction and Inattention (formerly Neglect): (not recorded)    Total (NIH Stroke Scale): (not recorded)     Additional notable assessment information:     Nursing report ED to floor:    Alvaro Saldana RN   09/23/24 01:22 EDT     Electronically signed by Alvaro Saldana RN at 09/23/24 0122       Travis Olmstead MD at 09/22/24 2042          Subjective   History of Present Illness  Chief complaint Short of breath cough    History of present illness is a 64-year-old female who recently went to [place] who now complains of cough dry throat and increasing shortness of breath she had been taken over-the-counter medicine without relief she stopped doing it 4 days ago.  She has had no real high fever or chills.  No ill exposures.  No foreign travels no leg pain or swelling no chest pain neck arm jaw pain some tightness.  No urinary complaints.  Symptoms have become severe she states she cannot walk very far before she has to stop because he show short of breath.  Cough is been nonproductive.      Review of Systems   Constitutional:  Negative for chills and fever.   HENT:  Positive for congestion.    Respiratory:  Positive for cough, chest tightness and shortness of breath.    Cardiovascular:  Negative for chest pain and palpitations.   Gastrointestinal:  Negative for abdominal pain and vomiting.   Genitourinary:  Negative for difficulty urinating and dysuria.   Musculoskeletal:  Negative for back pain.   Skin:  Negative for rash.   Neurological:  Negative for dizziness and light-headedness.   Psychiatric/Behavioral:  Negative for confusion.        Past Medical History:   Diagnosis Date     Family history of colon cancer     Screening for colon cancer 2020    Umbilical hernia     Varicose vein of leg        Allergies   Allergen Reactions    Penicillins Nausea And Vomiting and Shortness Of Breath    Morphine Nausea And Vomiting and Hallucinations       Past Surgical History:   Procedure Laterality Date    BREAST BIOPSY Left      SECTION      x2    CHOLECYSTECTOMY      COLONOSCOPY N/A 2020    Procedure: COLONOSCOPY WITH POLYPECTOMY X'S 4;  Surgeon: Sen Chirinos MD;  Location: [facility] ENDOSCOPY;  Service: Gastroenterology;  Laterality: N/A;  POLYPS, INTERNAL AND EXTERNAL HEMORRHOIDS    ERCP      HAND SURGERY      partial amputation of right index finger and tendon repair left hand       Family History   Problem Relation Age of Onset    Colon cancer Father     Colon cancer Brother     Breast cancer Other     Breast cancer Sister     Leukemia Maternal Aunt     Lung cancer Maternal Uncle     Cancer Brother         Renal    Multiple myeloma Paternal Uncle     Throat cancer Paternal Uncle        Social History     Socioeconomic History    Marital status: Single   Tobacco Use    Smoking status: Never    Smokeless tobacco: Never   Vaping Use    Vaping status: Never Used   Substance and Sexual Activity    Alcohol use: Yes     Comment: 2-3 drinks/week    Drug use: Never    Sexual activity: Defer     Prior to Admission medications    Medication Sig Start Date End Date Taking? Authorizing Provider   Diclofenac Sodium (VOLTAREN) 1 % gel gel apply 2 gram by topical route 4 times every day to the affected area(s) 2/3/21      diclofenac sodium (VOTAREN XR) 100 MG 24 hr tablet TAKE 1 TABLET BY MOUTH EVERY DAY 23      hydrOXYzine (ATARAX) 25 MG tablet take 1 tablet by oral route 30-60 minutes before bedtime as needed for anxiety 23      latanoprost (XALATAN) 0.005 % ophthalmic solution Instill 1 drop into each eye at bedtime 3/15/23      omeprazole (priLOSEC) 40 MG capsule Take  1 capsule by mouth Daily before a meal. 2/3/21      sertraline (Zoloft) 25 MG tablet take 1 tablet by oral route  every day 7/8/24               Objective   Physical Exam  Constitutional 64-year-old female awake alert moderate distress temperature 99 oxygen sats 94% room air.  Triage vital signs reviewed.  HEENT extraocular muscles intact pupils equal round reactive mouth clear no exudate abscess stridor drooling neck supple no adenopathy no meningeal signs no JVD no bruits lungs some scattered wheezes and rhonchi and rales throughout but no retraction no use of accessories.  Heart regular without murmur or rub.  Abdomen soft nontender good bowel sounds no masses extremities pulses equal throughout upper and lower extremities no edema no cords no Homans' sign no evidence of DVT skin is warm and dry no cellulitic changes neurologic awake alert and follows commands motor strength normal without focal weakness.  Procedures          ED Course      Results for orders placed or performed during the hospital encounter of 09/22/24   Respiratory Panel PCR w/COVID-19(SARS-CoV-2) [facility]/BERNADETTE/MARIEL/PAD/COR/BRIDGET In-House, NP Swab in UTM/VTM, 2 HR TAT - Swab, Nasopharynx    Specimen: Nasopharynx; Swab   Result Value Ref Range    ADENOVIRUS, PCR Not Detected Not Detected    Coronavirus 229E Not Detected Not Detected    Coronavirus HKU1 Not Detected Not Detected    Coronavirus NL63 Not Detected Not Detected    Coronavirus OC43 Not Detected Not Detected    COVID19 Not Detected Not Detected - Ref. Range    Human Metapneumovirus Not Detected Not Detected    Human Rhinovirus/Enterovirus Not Detected Not Detected    Influenza A PCR Not Detected Not Detected    Influenza B PCR Not Detected Not Detected    Parainfluenza Virus 1 Not Detected Not Detected    Parainfluenza Virus 2 Not Detected Not Detected    Parainfluenza Virus 3 Not Detected Not Detected    Parainfluenza Virus 4 Not Detected Not Detected    RSV, PCR Not Detected Not Detected     Bordetella pertussis pcr Not Detected Not Detected    Bordetella parapertussis PCR Not Detected Not Detected    Chlamydophila pneumoniae PCR Not Detected Not Detected    Mycoplasma pneumo by PCR Detected (A) Not Detected   Comprehensive Metabolic Panel    Specimen: Arm, Right; Blood   Result Value Ref Range    Glucose 108 (H) 65 - 99 mg/dL    BUN 8 8 - 23 mg/dL    Creatinine 0.58 0.57 - 1.00 mg/dL    Sodium 138 136 - 145 mmol/L    Potassium 3.8 3.5 - 5.2 mmol/L    Chloride 100 98 - 107 mmol/L    CO2 25.6 22.0 - 29.0 mmol/L    Calcium 9.3 8.6 - 10.5 mg/dL    Total Protein 7.2 6.0 - 8.5 g/dL    Albumin 3.8 3.5 - 5.2 g/dL    ALT (SGPT) 76 (H) 1 - 33 U/L    AST (SGOT) 62 (H) 1 - 32 U/L    Alkaline Phosphatase 183 (H) 39 - 117 U/L    Total Bilirubin 0.6 0.0 - 1.2 mg/dL    Globulin 3.4 gm/dL    A/G Ratio 1.1 g/dL    BUN/Creatinine Ratio 13.8 7.0 - 25.0    Anion Gap 12.4 5.0 - 15.0 mmol/L    eGFR 102.5 >60.0 mL/min/1.73   High Sensitivity Troponin T    Specimen: Arm, Right; Blood   Result Value Ref Range    HS Troponin T <6 <14 ng/L   BNP    Specimen: Arm, Right; Blood   Result Value Ref Range    proBNP 57.6 0.0 - 900.0 pg/mL   CBC Auto Differential    Specimen: Arm, Right; Blood   Result Value Ref Range    WBC 13.12 (H) 3.40 - 10.80 10*3/mm3    RBC 4.23 3.77 - 5.28 10*6/mm3    Hemoglobin 13.0 12.0 - 15.9 g/dL    Hematocrit 39.3 34.0 - 46.6 %    MCV 92.9 79.0 - 97.0 fL    MCH 30.7 26.6 - 33.0 pg    MCHC 33.1 31.5 - 35.7 g/dL    RDW 13.5 12.3 - 15.4 %    RDW-SD 45.9 37.0 - 54.0 fl    MPV 9.6 6.0 - 12.0 fL    Platelets 256 140 - 450 10*3/mm3    Neutrophil % 81.4 (H) 42.7 - 76.0 %    Lymphocyte % 10.4 (L) 19.6 - 45.3 %    Monocyte % 6.7 5.0 - 12.0 %    Eosinophil % 0.9 0.3 - 6.2 %    Basophil % 0.2 0.0 - 1.5 %    Immature Grans % 0.4 0.0 - 0.5 %    Neutrophils, Absolute 10.67 (H) 1.70 - 7.00 10*3/mm3    Lymphocytes, Absolute 1.37 0.70 - 3.10 10*3/mm3    Monocytes, Absolute 0.88 0.10 - 0.90 10*3/mm3    Eosinophils, Absolute  0.12 0.00 - 0.40 10*3/mm3    Basophils, Absolute 0.03 0.00 - 0.20 10*3/mm3    Immature Grans, Absolute 0.05 0.00 - 0.05 10*3/mm3    nRBC 0.0 0.0 - 0.2 /100 WBC   High Sensitivity Troponin T 2Hr    Specimen: Arm, Left; Blood   Result Value Ref Range    HS Troponin T <6 <14 ng/L    Troponin T Delta     POC Lactate    Specimen: Blood   Result Value Ref Range    Lactate 0.5 0.3 - 2.0 mmol/L   ECG 12 Lead Dyspnea   Result Value Ref Range    QT Interval 324 ms    QTC Interval 424 ms     CT Angiogram Chest Pulmonary Embolism    Result Date: 9/22/2024  Impression: 1.Diffuse bilateral tree-in-bud airspace opacities consistent with pneumonia. 2.No evidence of pulmonary embolus. Electronically Signed: Gianfranco Pandya MD  9/22/2024 11:57 PM EDT  Workstation ID: [id number]    XR Chest 1 View    Result Date: 9/22/2024  Impression: Right perihilar and right lower lobe pneumonia. Electronically Signed: Gianfranco Pandya MD  9/22/2024 8:55 PM EDT  Workstation ID: [id number]   Medications   sodium chloride 0.9 % flush 10 mL (has no administration in time range)   ipratropium-albuterol (DUO-NEB) nebulizer solution 3 mL (3 mL Nebulization Given 9/22/24 2044)   cefTRIAXone (ROCEPHIN) 2,000 mg in sodium chloride 0.9 % 100 mL MBP (0 mg Intravenous Stopped 9/22/24 2200)   doxycycline (VIBRAMYCIN) 100 mg in sodium chloride 0.9 % 100 mL MBP (0 mg Intravenous Stopped 9/22/24 2348)   iopamidol (ISOVUE-370) 76 % injection 100 mL (100 mL Intravenous Given 9/22/24 2317)                                    EKG my interpretation normal sinus rhythm rate of 100 normal axis hypertrophy nonspecific T wave changes noted in the anterior leads abnormal EKG nothing old to compare with          Medical Decision Making  Decision made.  IV established monitor placement review of sinus rhythm oxygen sats about 91% on room air.  Given a DuoNeb here.  Patient had a EKG my interpretation normal sinus rhythm the 100 nonspecific T wave changes noted anteriorly nothing old to  compare with but abnormal EKG.  Labs obtained by independent review of his metabolic profile unremarkable ALT of 76 AST of 62 alk phos of 183.  Troponins were negative CBC unremarkable white count 13.12 lactic at 0.5 blood cultures pending.  Patient's chest x-ray obtained my independent review shows a right lower lobe infiltrate.  Radiology review the same.  I do not see evidence of pneumothorax or failure.  In light of her long car ride recent travel to [place] concerns for pulmonary embolism without hypoxia or worry.  CT scan was obtained my independent review I do not see any pulmonary embolism but has bilateral pneumonia.  Radiology review diffuse bilateral tree-in-bud airspace opacities consistent with pneumonia no PE.  Patient on repeat exam still) sats about 91% still with some scattered wheezing.  Cough.  I do not see any evidence that suggest sepsis I do not see any evidence that suggest acute myocardial infarction DVT pulmonary embolism or dissection pericarditis myocarditis pericardial effusions although not a complete list of all possibilities.  Patient has been started on Rocephin and doxycycline IV.  Respiratory panel returned positive for mycoplasma.  Patient made aware of the findings.  She remains her sats in the low 90s.  I talked to the nurse practitioner for Dr. Dias and the case was discussed and the patient will be admitted for further care to the hospital stable otherwise unremarkable ER course.    Problems Addressed:  Acute respiratory distress: complicated acute illness or injury  Bronchospasm: complicated acute illness or injury  Pneumonia of both lungs due to infectious organism, unspecified part of lung: complicated acute illness or injury  Pneumonia of both lungs due to Mycoplasma pneumoniae, unspecified part of lung: complicated acute illness or injury    Amount and/or Complexity of Data Reviewed  Labs: ordered. Decision-making details documented in ED Course.  Radiology: ordered and  independent interpretation performed. Decision-making details documented in ED Course.  ECG/medicine tests: ordered and independent interpretation performed. Decision-making details documented in ED Course.    Risk  Decision regarding hospitalization.        Final diagnoses:   Acute respiratory distress   Bronchospasm   Pneumonia of both lungs due to infectious organism, unspecified part of lung   Pneumonia of both lungs due to Mycoplasma pneumoniae, unspecified part of lung       ED Disposition  ED Disposition       ED Disposition   Decision to Admit    Condition   --    Comment   Level of Care: Telemetry [5]   Admitting Physician: JEWEL JOSUE [[id number]]   Attending Physician: JEWEL JOSUE [[id number]]                 No follow-up provider specified.       Medication List      No changes were made to your prescriptions during this visit.            Travis Olmstead MD  09/23/24 0049      Electronically signed by Travis Olmstead MD at 09/23/24 0049       Vital Signs (last day)       Date/Time Temp Temp src Pulse Resp BP Patient Position SpO2    09/23/24 0931 98.1 (36.7) Axillary 87 32 118/58 Sitting 94    09/23/24 0600 97.9 (36.6) Oral 92 31 109/64 Lying 95    09/23/24 0151 99.1 (37.3) Oral 97 34 130/65 Lying 92    09/23/24 0102 -- -- 103 -- 112/66 -- 90    09/23/24 0002 -- -- 103 -- 114/55 -- 91    09/22/24 2247 -- -- 99 28 134/79 -- 91    09/22/24 2232 -- -- 103 -- 128/67 -- 90    09/22/24 2222 -- -- 102 -- 118/67 -- 92    09/22/24 2147 -- -- 107 29 131/81 -- 91    09/22/24 2048 -- -- 100 18 -- -- 93    09/22/24 2044 -- -- 103 18 -- -- 93    09/22/24 2029 99.9 (37.7) Oral 104 18 147/79 Sitting 94          Oxygen Therapy (last day)       Date/Time SpO2 Device (Oxygen Therapy) Flow (L/min) Oxygen Concentration (%) ETCO2 (mmHg)    09/23/24 0931 94 nasal cannula 2 -- --    09/23/24 0800 -- nasal cannula 2 -- --    09/23/24 0600 95 nasal cannula 2 -- --    09/23/24 0400 -- nasal cannula 2 -- --    09/23/24 0151 92 room air -- --  --    09/23/24 0102 90 -- -- -- --    09/23/24 0002 91 -- -- -- --    09/22/24 2247 91 -- -- -- --    09/22/24 2232 90 -- -- -- --    09/22/24 2222 92 -- -- -- --    09/22/24 2147 91 -- -- -- --    09/22/24 2048 93 room air -- -- --    09/22/24 2044 93 room air -- -- --    09/22/24 2029 94 room air -- -- --          Facility-Administered Medications as of 9/23/2024   Medication Dose Route Frequency Provider Last Rate Last Admin    acetaminophen (TYLENOL) tablet 650 mg  650 mg Oral Q6H PRN Awilda Doty APRN   650 mg at 09/23/24 0923    sennosides-docusate (PERICOLACE) 8.6-50 MG per tablet 2 tablet  2 tablet Oral BID PRN Bhavya Mathias APRN        And    polyethylene glycol (MIRALAX) packet 17 g  17 g Oral Daily PRN Bhavya Mathias APRN        And    bisacodyl (DULCOLAX) EC tablet 5 mg  5 mg Oral Daily PRN Bhavya Mathias APRN        And    bisacodyl (DULCOLAX) suppository 10 mg  10 mg Rectal Daily PRN Bhavya Mathias APRN        budesonide (PULMICORT) nebulizer solution 0.5 mg  0.5 mg Nebulization BID - RT Tash Dias MD        [COMPLETED] cefTRIAXone (ROCEPHIN) 2,000 mg in sodium chloride 0.9 % 100 mL MBP  2,000 mg Intravenous Once Travis Olmstead MD   Stopped at 09/22/24 2200    [COMPLETED] doxycycline (VIBRAMYCIN) 100 mg in sodium chloride 0.9 % 100 mL MBP  100 mg Intravenous Once Travis Olmstead MD   Stopped at 09/22/24 2348    doxycycline (VIBRAMYCIN) 100 mg in sodium chloride 0.9 % 100 mL MBP  100 mg Intravenous Q12H Bhavya Mathias APRN   100 mg at 09/23/24 0923    guaifenesin (ROBITUSSIN) 100 MG/5ML liquid 200 mg  200 mg Oral Q4H PRN Bhavya Mathias, KATINA   200 mg at 09/23/24 1155    [COMPLETED] iopamidol (ISOVUE-370) 76 % injection 100 mL  100 mL Intravenous Once in imaging Travis Olmstead MD   100 mL at 09/22/24 2317    [COMPLETED] ipratropium-albuterol (DUO-NEB) nebulizer solution 3 mL  3 mL Nebulization Once Travis Olmstead MD   3 mL at 09/22/24 2044    ipratropium-albuterol (DUO-NEB) nebulizer  solution 3 mL  3 mL Nebulization Q4H PRN Bhavya Mathias APRN        melatonin tablet 5 mg  5 mg Oral Nightly PRN Bhavya Mathias APRN   5 mg at 09/23/24 0255    nitroglycerin (NITROSTAT) SL tablet 0.4 mg  0.4 mg Sublingual Q5 Min PRN Bhavya Mathias APRN        ondansetron (ZOFRAN) injection 4 mg  4 mg Intravenous Q6H PRN Bhavya Mathias APRN        promethazine (PHENERGAN) tablet 25 mg  25 mg Oral Q6H PRN Tash Dias MD        sodium chloride 0.9 % flush 10 mL  10 mL Intravenous PRN Travis Olmstead MD        sodium chloride 0.9 % flush 10 mL  10 mL Intravenous Q12H Bhavya Mathias APRN   10 mL at 09/23/24 0821    sodium chloride 0.9 % flush 10 mL  10 mL Intravenous PRN Bhavya Mathias APRN        sodium chloride 0.9 % infusion 40 mL  40 mL Intravenous PRN Bhavya Mathias APRN         Lab Results (last 24 hours)       Procedure Component Value Units Date/Time    Basic Metabolic Panel [[id number]]  (Abnormal) Collected: 09/23/24 0319    Specimen: Blood from Arm, Left Updated: 09/23/24 0356     Glucose 105 mg/dL      BUN 7 mg/dL      Creatinine 0.64 mg/dL      Sodium 139 mmol/L      Potassium 3.9 mmol/L      Chloride 101 mmol/L      CO2 27.5 mmol/L      Calcium 9.0 mg/dL      BUN/Creatinine Ratio 10.9     Anion Gap 10.5 mmol/L      eGFR 100.1 mL/min/1.73     Narrative:      GFR Normal >60  Chronic Kidney Disease <60  Kidney Failure <15      CBC & Differential [[id number]]  (Abnormal) Collected: 09/23/24 0319    Specimen: Blood from Arm, Left Updated: 09/23/24 0328    Narrative:      The following orders were created for panel order CBC & Differential.  Procedure                               Abnormality         Status                     ---------                               -----------         ------                     CBC Auto Differential[[id number]]        Abnormal            Final result                 Please view results for these tests on the individual orders.    CBC Auto Differential [[id number]]   (Abnormal) Collected: 09/23/24 0319    Specimen: Blood from Arm, Left Updated: 09/23/24 0328     WBC 11.26 10*3/mm3      RBC 3.97 10*6/mm3      Hemoglobin 12.3 g/dL      Hematocrit 37.0 %      MCV 93.2 fL      MCH 31.0 pg      MCHC 33.2 g/dL      RDW 13.6 %      RDW-SD 46.6 fl      MPV 9.6 fL      Platelets 249 10*3/mm3      Neutrophil % 77.9 %      Lymphocyte % 12.6 %      Monocyte % 8.1 %      Eosinophil % 0.7 %      Basophil % 0.2 %      Immature Grans % 0.5 %      Neutrophils, Absolute 8.77 10*3/mm3      Lymphocytes, Absolute 1.42 10*3/mm3      Monocytes, Absolute 0.91 10*3/mm3      Eosinophils, Absolute 0.08 10*3/mm3      Basophils, Absolute 0.02 10*3/mm3      Immature Grans, Absolute 0.06 10*3/mm3      nRBC 0.0 /100 WBC     High Sensitivity Troponin T 2Hr [[id number]] Collected: 09/22/24 2326    Specimen: Blood from Arm, Left Updated: 09/23/24 0003     HS Troponin T <6 ng/L      Troponin T Delta --     Comment: Unable to calculate.       Narrative:      High Sensitive Troponin T Reference Range:  <14.0 ng/L- Negative Female for AMI  <22.0 ng/L- Negative Male for AMI  >=14 - Abnormal Female indicating possible myocardial injury.  >=22 - Abnormal Male indicating possible myocardial injury.   Clinicians would have to utilize clinical acumen, EKG, Troponin, and serial changes to determine if it is an Acute Myocardial Infarction or myocardial injury due to an underlying chronic condition.         Respiratory Panel PCR w/COVID-19(SARS-CoV-2) [facility]CH/BERNADETTE/MARIEL/PAD/COR/BRIDGET In-House, NP Swab in [facility], 2 HR TAT - Swab, Nasopharynx [[id number]]  (Abnormal) Collected: 09/22/24 2106    Specimen: Swab from Nasopharynx Updated: 09/22/24 2218     ADENOVIRUS, PCR Not Detected     Coronavirus 229E Not Detected     Coronavirus HKU1 Not Detected     Coronavirus NL63 Not Detected     Coronavirus OC43 Not Detected     COVID19 Not Detected     Human Metapneumovirus Not Detected     Human Rhinovirus/Enterovirus Not Detected     Influenza A  PCR Not Detected     Influenza B PCR Not Detected     Parainfluenza Virus 1 Not Detected     Parainfluenza Virus 2 Not Detected     Parainfluenza Virus 3 Not Detected     Parainfluenza Virus 4 Not Detected     RSV, PCR Not Detected     Bordetella pertussis pcr Not Detected     Bordetella parapertussis PCR Not Detected     Chlamydophila pneumoniae PCR Not Detected     Mycoplasma pneumo by PCR Detected    Narrative:      In the setting of a positive respiratory panel with a viral infection PLUS a negative procalcitonin without other underlying concern for bacterial infection, consider observing off antibiotics or discontinuation of antibiotics and continue supportive care. If the respiratory panel is positive for atypical bacterial infection (Bordetella pertussis, Chlamydophila pneumoniae, or Mycoplasma pneumoniae), consider antibiotic de-escalation to target atypical bacterial infection.    Comprehensive Metabolic Panel [[id number]]  (Abnormal) Collected: 09/22/24 2105    Specimen: Blood from Arm, Right Updated: 09/22/24 2145     Glucose 108 mg/dL      BUN 8 mg/dL      Creatinine 0.58 mg/dL      Sodium 138 mmol/L      Potassium 3.8 mmol/L      Chloride 100 mmol/L      CO2 25.6 mmol/L      Calcium 9.3 mg/dL      Total Protein 7.2 g/dL      Albumin 3.8 g/dL      ALT (SGPT) 76 U/L      AST (SGOT) 62 U/L      Alkaline Phosphatase 183 U/L      Total Bilirubin 0.6 mg/dL      Globulin 3.4 gm/dL      A/G Ratio 1.1 g/dL      BUN/Creatinine Ratio 13.8     Anion Gap 12.4 mmol/L      eGFR 102.5 mL/min/1.73     Narrative:      GFR Normal >60  Chronic Kidney Disease <60  Kidney Failure <15      High Sensitivity Troponin T [[id number]]  (Normal) Collected: 09/22/24 2105    Specimen: Blood from Arm, Right Updated: 09/22/24 2145     HS Troponin T <6 ng/L     Narrative:      High Sensitive Troponin T Reference Range:  <14.0 ng/L- Negative Female for AMI  <22.0 ng/L- Negative Male for AMI  >=14 - Abnormal Female indicating possible  myocardial injury.  >=22 - Abnormal Male indicating possible myocardial injury.   Clinicians would have to utilize clinical acumen, EKG, Troponin, and serial changes to determine if it is an Acute Myocardial Infarction or myocardial injury due to an underlying chronic condition.         BNP [[id number]]  (Normal) Collected: 09/22/24 2105    Specimen: Blood from Arm, Right Updated: 09/22/24 2145     proBNP 57.6 pg/mL     Narrative:      This assay is used as an aid in the diagnosis of individuals suspected of having heart failure. It can be used as an aid in the diagnosis of acute decompensated heart failure (ADHF) in patients presenting with signs and symptoms of ADHF to the emergency department (ED). In addition, NT-proBNP of <300 pg/mL indicates ADHF is not likely.    Age Range Result Interpretation  NT-proBNP Concentration (pg/mL:      <50             Positive            >450                   Gray                 300-450                    Negative             <300    50-75           Positive            >900                  Gray                300-900                  Negative            <300      >75             Positive            >1800                  Gray                300-1800                  Negative            <300    Blood Culture - Blood, Arm, Left [[id number]] Collected: 09/22/24 2124    Specimen: Blood from Arm, Left Updated: 09/22/24 2128    POC Lactate [[id number]]  (Normal) Collected: 09/22/24 2113    Specimen: Blood Updated: 09/22/24 2115     Lactate 0.5 mmol/L      Comment: Serial Number: [id number]:  [id number]       CBC & Differential [[id number]]  (Abnormal) Collected: 09/22/24 2105    Specimen: Blood from Arm, Right Updated: 09/22/24 2112    Narrative:      The following orders were created for panel order CBC & Differential.  Procedure                               Abnormality         Status                     ---------                               -----------         ------                      CBC Auto Differential[[id number]]        Abnormal            Final result                 Please view results for these tests on the individual orders.    CBC Auto Differential [[id number]]  (Abnormal) Collected: 09/22/24 2105    Specimen: Blood from Arm, Right Updated: 09/22/24 2112     WBC 13.12 10*3/mm3      RBC 4.23 10*6/mm3      Hemoglobin 13.0 g/dL      Hematocrit 39.3 %      MCV 92.9 fL      MCH 30.7 pg      MCHC 33.1 g/dL      RDW 13.5 %      RDW-SD 45.9 fl      MPV 9.6 fL      Platelets 256 10*3/mm3      Neutrophil % 81.4 %      Lymphocyte % 10.4 %      Monocyte % 6.7 %      Eosinophil % 0.9 %      Basophil % 0.2 %      Immature Grans % 0.4 %      Neutrophils, Absolute 10.67 10*3/mm3      Lymphocytes, Absolute 1.37 10*3/mm3      Monocytes, Absolute 0.88 10*3/mm3      Eosinophils, Absolute 0.12 10*3/mm3      Basophils, Absolute 0.03 10*3/mm3      Immature Grans, Absolute 0.05 10*3/mm3      nRBC 0.0 /100 WBC     Blood Culture - Blood, Arm, Right [[id number]] Collected: 09/22/24 2105    Specimen: Blood from Arm, Right Updated: 09/22/24 2110          Imaging Results (Last 48 Hours)       Procedure Component Value Units Date/Time    CT Angiogram Chest Pulmonary Embolism [[id number]] Collected: 09/22/24 2353     Updated: 09/22/24 2359    Narrative:      CT ANGIOGRAM CHEST PULMONARY EMBOLISM    Date of Exam: 9/22/2024 11:16 PM EDT    Indication: Short of breath recent travel to [place].    Comparison: None available.    Technique: Axial CT images were obtained of the chest after the uneventful intravenous administration of iodinated contrast utilizing pulmonary embolism protocol.  Sagittal and coronal reconstructions were performed.  Automated exposure control and   iterative reconstruction methods were used.      Findings:  Hilum and Mediastinum: No pathologically enlarged lymph nodes.  Normal heart size.   No pericardial effusion.  Unremarkable thoracic aorta and pulmonary arteries.    Lung Parenchyma and  Pleura: There are diffuse bilateral tree-in-bud airspace opacities greatest in the right lower lobe but seen diffusely throughout both lungs. These findings are consistent with diffuse pneumonia, most likely atypical pneumonia.    Upper Abdomen: There are clips from cholecystectomy. There is mild fatty infiltration of the liver.    Soft tissues: Unremarkable.    Osseous structures: No aggressive focal lytic or sclerotic osseous lesions.      Impression:      Impression:  1.Diffuse bilateral tree-in-bud airspace opacities consistent with pneumonia.  2.No evidence of pulmonary embolus.        Electronically Signed: Gianfranco Pandya MD    9/22/2024 11:57 PM EDT    Workstation ID: [id number]    XR Chest 1 View [[id number]] Collected: 09/22/24 2054     Updated: 09/22/24 2057    Narrative:      XR CHEST 1 VW    Date of Exam: 9/22/2024 8:50 PM EDT    Indication: Short of breath and cough    Comparison: None available.    Findings:  The heart size and pulmonary vascular markings are normal. The left lung is clear. There is right perihilar and right lower lobe airspace disease consistent with pneumonia.      Impression:      Impression:  Right perihilar and right lower lobe pneumonia.        Electronically Signed: Gianfranco Pandya MD    9/22/2024 8:55 PM EDT    Workstation ID: [id number]

## 2024-09-23 NOTE — ED NOTES
Nursing report ED to floor  Kelly Garcia  64 y.o.  female    HPI:   Chief Complaint   Patient presents with    Shortness of Breath       Admitting doctor:   Tash Dias MD    Admitting diagnosis:   The primary encounter diagnosis was Acute respiratory distress. Diagnoses of Bronchospasm, Pneumonia of both lungs due to infectious organism, unspecified part of lung, and Pneumonia of both lungs due to Mycoplasma pneumoniae, unspecified part of lung were also pertinent to this visit.    Code status:   Current Code Status       Date Active Code Status Order ID Comments User Context       Not on file            Allergies:   Penicillins and Morphine    Isolation:  Droplet     Fall Risk:  Fall Risk Assessment was completed, and patient is at moderate risk for falls.   Predictive Model Details         12 (Low) Factor Value    Calculated 9/23/2024 01:20 Age 64    Risk of Fall Model Active Peripheral IV Present     Imaging order in this encounter Present     Respiratory Rate 28     Number of Distinct Medication Classes administered 5     Magnesium not on file     Pop Scale not on file     Diastolic BP 79     Chloride 100 mmol/L     Albumin 3.8 g/dL     ALT 76 U/L     Total Bilirubin 0.6 mg/dL     Creatinine 0.58 mg/dL     Calcium 9.3 mg/dL     Days after Admission 0.201     Potassium 3.8 mmol/L         Weight:       09/22/24 2029   Weight: 115 kg (253 lb 3.2 oz)       Intake and Output    Intake/Output Summary (Last 24 hours) at 9/23/2024 0122  Last data filed at 9/22/2024 2348  Gross per 24 hour   Intake 100 ml   Output --   Net 100 ml       Diet:        Most recent vitals:   Vitals:    09/22/24 2232 09/22/24 2247 09/23/24 0002 09/23/24 0102   BP: 128/67 134/79 114/55 112/66   BP Location:       Patient Position:       Pulse: 103 99 103 103   Resp:  28     Temp:       TempSrc:       SpO2: 90% 91% 91% 90%   Weight:       Height:           Active LDAs/IV Access:   Lines, Drains & Airways       Active LDAs       Name  Placement date Placement time Site Days    Peripheral IV 12/22/21 1400 Right Antecubital 12/22/21  1400  Antecubital  1005    Peripheral IV 09/22/24 2103 Right Antecubital 09/22/24 2103  Antecubital  less than 1                    Skin Condition:   Skin Assessments (last day)       None             Labs (abnormal labs have a star):   Labs Reviewed   RESPIRATORY PANEL PCR W/ COVID-19 (SARS-COV-2), NP SWAB IN UTM/VTP, 2 HR TAT - Abnormal; Notable for the following components:       Result Value    Mycoplasma pneumo by PCR Detected (*)     All other components within normal limits    Narrative:     In the setting of a positive respiratory panel with a viral infection PLUS a negative procalcitonin without other underlying concern for bacterial infection, consider observing off antibiotics or discontinuation of antibiotics and continue supportive care. If the respiratory panel is positive for atypical bacterial infection (Bordetella pertussis, Chlamydophila pneumoniae, or Mycoplasma pneumoniae), consider antibiotic de-escalation to target atypical bacterial infection.   COMPREHENSIVE METABOLIC PANEL - Abnormal; Notable for the following components:    Glucose 108 (*)     ALT (SGPT) 76 (*)     AST (SGOT) 62 (*)     Alkaline Phosphatase 183 (*)     All other components within normal limits    Narrative:     GFR Normal >60  Chronic Kidney Disease <60  Kidney Failure <15     CBC WITH AUTO DIFFERENTIAL - Abnormal; Notable for the following components:    WBC 13.12 (*)     Neutrophil % 81.4 (*)     Lymphocyte % 10.4 (*)     Neutrophils, Absolute 10.67 (*)     All other components within normal limits   TROPONIN - Normal    Narrative:     High Sensitive Troponin T Reference Range:  <14.0 ng/L- Negative Female for AMI  <22.0 ng/L- Negative Male for AMI  >=14 - Abnormal Female indicating possible myocardial injury.  >=22 - Abnormal Male indicating possible myocardial injury.   Clinicians would have to utilize clinical acumen,  EKG, Troponin, and serial changes to determine if it is an Acute Myocardial Infarction or myocardial injury due to an underlying chronic condition.        BNP (IN-HOUSE) - Normal    Narrative:     This assay is used as an aid in the diagnosis of individuals suspected of having heart failure. It can be used as an aid in the diagnosis of acute decompensated heart failure (ADHF) in patients presenting with signs and symptoms of ADHF to the emergency department (ED). In addition, NT-proBNP of <300 pg/mL indicates ADHF is not likely.    Age Range Result Interpretation  NT-proBNP Concentration (pg/mL:      <50             Positive            >450                   Gray                 300-450                    Negative             <300    50-75           Positive            >900                  Gray                300-900                  Negative            <300      >75             Positive            >1800                  Gray                300-1800                  Negative            <300   POC LACTATE - Normal   BLOOD CULTURE   BLOOD CULTURE   HIGH SENSITIVITIY TROPONIN T 2HR    Narrative:     High Sensitive Troponin T Reference Range:  <14.0 ng/L- Negative Female for AMI  <22.0 ng/L- Negative Male for AMI  >=14 - Abnormal Female indicating possible myocardial injury.  >=22 - Abnormal Male indicating possible myocardial injury.   Clinicians would have to utilize clinical acumen, EKG, Troponin, and serial changes to determine if it is an Acute Myocardial Infarction or myocardial injury due to an underlying chronic condition.        POC LACTATE   CBC AND DIFFERENTIAL    Narrative:     The following orders were created for panel order CBC & Differential.  Procedure                               Abnormality         Status                     ---------                               -----------         ------                     CBC Auto Differential[[id number]]        Abnormal            Final result                  Please view results for these tests on the individual orders.       LOC: Person, Place, Time, and Situation    Telemetry:  Telemetry    Cardiac Monitoring Ordered: yes    EKG:   ECG 12 Lead Dyspnea   Preliminary Result   HEART PNCE=276  bpm   RR Nwfdomhz=153  ms   VT Fhvdusiu=841  ms   P Horizontal Axis=7  deg   P Front Axis=23  deg   QRSD Interval=83  ms   QT Nkdauvpm=330  ms   FWyY=709  ms   QRS Axis=25  deg   T Wave Axis=  deg   - ABNORMAL ECG -   Sinus tachycardia   Low voltage, precordial leads   Nonspecific T abnrm, anterolateral leads   Date and Time of Study:2024-09-22 20:42:36      Telemetry Scan   Final Result      Telemetry Scan   Final Result          Medications Given in the ED:   Medications   sodium chloride 0.9 % flush 10 mL (has no administration in time range)   ipratropium-albuterol (DUO-NEB) nebulizer solution 3 mL (3 mL Nebulization Given 9/22/24 2044)   cefTRIAXone (ROCEPHIN) 2,000 mg in sodium chloride 0.9 % 100 mL MBP (0 mg Intravenous Stopped 9/22/24 2200)   doxycycline (VIBRAMYCIN) 100 mg in sodium chloride 0.9 % 100 mL MBP (0 mg Intravenous Stopped 9/22/24 2348)   iopamidol (ISOVUE-370) 76 % injection 100 mL (100 mL Intravenous Given 9/22/24 2317)       Imaging results:  CT Angiogram Chest Pulmonary Embolism    Result Date: 9/22/2024  Impression: 1.Diffuse bilateral tree-in-bud airspace opacities consistent with pneumonia. 2.No evidence of pulmonary embolus. Electronically Signed: Gianfranco Pandya MD  9/22/2024 11:57 PM EDT  Workstation ID: [id number]    XR Chest 1 View    Result Date: 9/22/2024  Impression: Right perihilar and right lower lobe pneumonia. Electronically Signed: Gianfranco Pandya MD  9/22/2024 8:55 PM EDT  Workstation ID: [id number]     Social issues:   Social History     Socioeconomic History    Marital status: Single   Tobacco Use    Smoking status: Never    Smokeless tobacco: Never   Vaping Use    Vaping status: Never Used   Substance and Sexual Activity    Alcohol use: Yes      Comment: 2-3 drinks/week    Drug use: Never    Sexual activity: Defer       NIH Stroke Scale:  Interval: (not recorded)  1a. Level of Consciousness: (not recorded)  1b. LOC Questions: (not recorded)  1c. LOC Commands: (not recorded)  2. Best Gaze: (not recorded)  3. Visual: (not recorded)  4. Facial Palsy: (not recorded)  5a. Motor Arm, Left: (not recorded)  5b. Motor Arm, Right: (not recorded)  6a. Motor Leg, Left: (not recorded)  6b. Motor Leg, Right: (not recorded)  7. Limb Ataxia: (not recorded)  8. Sensory: (not recorded)  9. Best Language: (not recorded)  10. Dysarthria: (not recorded)  11. Extinction and Inattention (formerly Neglect): (not recorded)    Total (NIH Stroke Scale): (not recorded)     Additional notable assessment information:     Nursing report ED to floor:    Alvaro Saldana RN   09/23/24 01:22 EDT

## 2024-09-24 LAB
ANION GAP SERPL CALCULATED.3IONS-SCNC: 8.9 MMOL/L (ref 5–15)
BASOPHILS # BLD AUTO: 0.02 10*3/MM3 (ref 0–0.2)
BASOPHILS NFR BLD AUTO: 0.2 % (ref 0–1.5)
BUN SERPL-MCNC: 6 MG/DL (ref 8–23)
BUN/CREAT SERPL: 18.2 (ref 7–25)
CALCIUM SPEC-SCNC: 8.9 MG/DL (ref 8.6–10.5)
CHLORIDE SERPL-SCNC: 104 MMOL/L (ref 98–107)
CO2 SERPL-SCNC: 29.1 MMOL/L (ref 22–29)
CREAT SERPL-MCNC: 0.33 MG/DL (ref 0.57–1)
DEPRECATED RDW RBC AUTO: 48.3 FL (ref 37–54)
EGFRCR SERPLBLD CKD-EPI 2021: 117.4 ML/MIN/1.73
EOSINOPHIL # BLD AUTO: 0.18 10*3/MM3 (ref 0–0.4)
EOSINOPHIL NFR BLD AUTO: 1.9 % (ref 0.3–6.2)
ERYTHROCYTE [DISTWIDTH] IN BLOOD BY AUTOMATED COUNT: 13.9 % (ref 12.3–15.4)
GLUCOSE SERPL-MCNC: 111 MG/DL (ref 65–99)
HCT VFR BLD AUTO: 35.5 % (ref 34–46.6)
HGB BLD-MCNC: 11.3 G/DL (ref 12–15.9)
IMM GRANULOCYTES # BLD AUTO: 0.04 10*3/MM3 (ref 0–0.05)
IMM GRANULOCYTES NFR BLD AUTO: 0.4 % (ref 0–0.5)
LYMPHOCYTES # BLD AUTO: 1.72 10*3/MM3 (ref 0.7–3.1)
LYMPHOCYTES NFR BLD AUTO: 18.2 % (ref 19.6–45.3)
MCH RBC QN AUTO: 30.1 PG (ref 26.6–33)
MCHC RBC AUTO-ENTMCNC: 31.8 G/DL (ref 31.5–35.7)
MCV RBC AUTO: 94.7 FL (ref 79–97)
MONOCYTES # BLD AUTO: 0.93 10*3/MM3 (ref 0.1–0.9)
MONOCYTES NFR BLD AUTO: 9.8 % (ref 5–12)
NEUTROPHILS NFR BLD AUTO: 6.58 10*3/MM3 (ref 1.7–7)
NEUTROPHILS NFR BLD AUTO: 69.5 % (ref 42.7–76)
NRBC BLD AUTO-RTO: 0 /100 WBC (ref 0–0.2)
PLATELET # BLD AUTO: 267 10*3/MM3 (ref 140–450)
PMV BLD AUTO: 9.4 FL (ref 6–12)
POTASSIUM SERPL-SCNC: 4.1 MMOL/L (ref 3.5–5.2)
RBC # BLD AUTO: 3.75 10*6/MM3 (ref 3.77–5.28)
SODIUM SERPL-SCNC: 142 MMOL/L (ref 136–145)
WBC NRBC COR # BLD AUTO: 9.47 10*3/MM3 (ref 3.4–10.8)

## 2024-09-24 PROCEDURE — 25010000002 ENOXAPARIN PER 10 MG: Performed by: INTERNAL MEDICINE

## 2024-09-24 PROCEDURE — 85025 COMPLETE CBC W/AUTO DIFF WBC: CPT

## 2024-09-24 PROCEDURE — 80048 BASIC METABOLIC PNL TOTAL CA: CPT

## 2024-09-24 PROCEDURE — 63710000001 PROMETHAZINE PER 25 MG: Performed by: INTERNAL MEDICINE

## 2024-09-24 PROCEDURE — 94664 DEMO&/EVAL PT USE INHALER: CPT

## 2024-09-24 PROCEDURE — 94761 N-INVAS EAR/PLS OXIMETRY MLT: CPT

## 2024-09-24 PROCEDURE — 94799 UNLISTED PULMONARY SVC/PX: CPT

## 2024-09-24 RX ADMIN — Medication 10 ML: at 09:14

## 2024-09-24 RX ADMIN — Medication 10 ML: at 22:02

## 2024-09-24 RX ADMIN — ACETAMINOPHEN 650 MG: 325 TABLET, FILM COATED ORAL at 09:14

## 2024-09-24 RX ADMIN — DOXYCYCLINE 100 MG: 100 INJECTION, POWDER, LYOPHILIZED, FOR SOLUTION INTRAVENOUS at 09:14

## 2024-09-24 RX ADMIN — Medication 5 MG: at 22:00

## 2024-09-24 RX ADMIN — DOXYCYCLINE 100 MG: 100 INJECTION, POWDER, LYOPHILIZED, FOR SOLUTION INTRAVENOUS at 22:01

## 2024-09-24 RX ADMIN — GUAIFENESIN 200 MG: 200 SOLUTION ORAL at 03:08

## 2024-09-24 RX ADMIN — BUDESONIDE 0.5 MG: 0.5 INHALANT RESPIRATORY (INHALATION) at 19:28

## 2024-09-24 RX ADMIN — IPRATROPIUM BROMIDE AND ALBUTEROL SULFATE 3 ML: .5; 3 SOLUTION RESPIRATORY (INHALATION) at 06:50

## 2024-09-24 RX ADMIN — GUAIFENESIN 200 MG: 200 SOLUTION ORAL at 16:54

## 2024-09-24 RX ADMIN — PROMETHAZINE HYDROCHLORIDE 25 MG: 25 TABLET ORAL at 23:04

## 2024-09-24 RX ADMIN — PROMETHAZINE HYDROCHLORIDE 25 MG: 25 TABLET ORAL at 03:08

## 2024-09-24 RX ADMIN — ACETAMINOPHEN 650 MG: 325 TABLET, FILM COATED ORAL at 16:54

## 2024-09-24 RX ADMIN — ACETAMINOPHEN 650 MG: 325 TABLET, FILM COATED ORAL at 03:08

## 2024-09-24 RX ADMIN — BUDESONIDE 0.5 MG: 0.5 INHALANT RESPIRATORY (INHALATION) at 06:54

## 2024-09-24 RX ADMIN — GUAIFENESIN 200 MG: 200 SOLUTION ORAL at 22:00

## 2024-09-24 RX ADMIN — GUAIFENESIN 200 MG: 200 SOLUTION ORAL at 09:13

## 2024-09-24 RX ADMIN — PROMETHAZINE HYDROCHLORIDE 25 MG: 25 TABLET ORAL at 09:14

## 2024-09-24 RX ADMIN — PROMETHAZINE HYDROCHLORIDE 25 MG: 25 TABLET ORAL at 16:54

## 2024-09-24 RX ADMIN — ENOXAPARIN SODIUM 40 MG: 100 INJECTION SUBCUTANEOUS at 22:00

## 2024-09-24 RX ADMIN — ENOXAPARIN SODIUM 40 MG: 100 INJECTION SUBCUTANEOUS at 09:14

## 2024-09-24 NOTE — PAYOR COMM NOTE
"Clinical update for case# [id number]     ================  AUTHORIZATION PENDING:   PLEASE FAX OR CALL DETERMINATION TO CONTACT BELOW:       THANK YOU,    ANKUSH Gutierrez, RN  Utilization Review  [facility]  Phone: [phone]  Fax: [phone]      NPI: [id number]  Tax ID: [id number]        Amari Garcia (64 y.o. Female)       Date of Birth   1960    Social Security Number       Address   [address] [facility] [place] [facility]    Home Phone   [phone]    MRN   [id number]       [facility]    Marital Status   Single                            Admission Date   9/22/24    Admission Type   Emergency    Admitting Provider   Tash Dias MD    Attending Provider   Tash Dias MD    Department, Room/Bed   [facility] PROGRESS CARE, 2129/1       Discharge Date       Discharge Disposition       Discharge Destination                                 Attending Provider: Tash Dias MD    Allergies: Penicillins, Morphine    Isolation: Droplet   Infection: Mycoplasma pneumonia (09/22/24)   Code Status: CPR    Ht: 162.6 cm (64\")   Wt: 116 kg (255 lb 11.7 oz)    Admission Cmt: None   Principal Problem: Pneumonia of right lower lobe due to infectious organism [J18.9]                   Active Insurance as of 9/22/2024       Primary Coverage       Payor Plan Insurance Group Employer/Plan Group    [facility] BLUE CROSS [employer] EM[employer] [id number]       Payor Plan Address Payor Plan Phone Number Payor Plan Fax Number Effective Dates    [address] [phone]  1/1/2020 - None Entered    [facility] [address]         Subscriber Name Subscriber Birth Date Member ID       AMARI GARCIA 1960 [id number]                     Emergency Contacts        (Rel.) Home Phone Work Phone Mobile Phone    GARSIAJOSIE (Brother) -- -- [phone]              Vital Signs (last day)       Date/Time Temp Temp src Pulse Resp BP Patient Position SpO2    09/24/24 0945 97.6 (36.4) Oral 84 " 24 109/50 Lying 96    09/24/24 0701 -- -- 79 17 -- Lying --    09/24/24 0655 -- -- 73 16 -- -- --    09/24/24 0653 -- -- 69 15 -- -- --    09/24/24 0650 -- -- 80 16 -- Lying 99    09/24/24 0517 97.7 (36.5) Oral 73 17 135/77 Lying 96    09/24/24 0127 97.7 (36.5) Oral 82 16 140/79 Lying 97    09/23/24 2200 98.1 (36.7) Oral 86 24 131/49 Lying 93    09/23/24 1939 -- -- 83 22 -- Lying 98    09/23/24 1935 -- -- 86 22 -- Lying 99    09/23/24 1934 -- -- 83 22 -- Lying 99    09/23/24 1930 -- -- 84 25 -- Lying 92    09/23/24 1728 98 (36.7) Oral 85 24 125/55 Lying 95    09/23/24 1500 97.8 (36.6) Oral 74 25 121/70 Lying 94    09/23/24 0931 98.1 (36.7) Axillary 87 32 118/58 Sitting 94    09/23/24 0600 97.9 (36.6) Oral 92 31 109/64 Lying 95    09/23/24 0151 99.1 (37.3) Oral 97 34 130/65 Lying 92    09/23/24 0102 -- -- 103 -- 112/66 -- 90    09/23/24 0002 -- -- 103 -- 114/55 -- 91          Oxygen Therapy (last day)       Date/Time SpO2 Device (Oxygen Therapy) Flow (L/min) Oxygen Concentration (%) ETCO2 (mmHg)    09/24/24 0945 96 nasal cannula 3 -- --    09/24/24 0800 -- nasal cannula 3 -- --    09/24/24 0650 99 nasal cannula 3 -- --    09/24/24 0517 96 nasal cannula 3 -- --    09/24/24 0400 -- nasal cannula 3 -- --    09/24/24 0127 97 nasal cannula 3 -- --    09/24/24 0000 -- nasal cannula 3 -- --    09/23/24 2200 93 nasal cannula 3 -- --    09/23/24 2002 -- nasal cannula 3 -- --    09/23/24 1939 98 nasal cannula 3 -- --    09/23/24 1935 99 nasal cannula 3 -- --    09/23/24 1934 99 nasal cannula 3 -- --    09/23/24 1930 92 nasal cannula 3 -- --    09/23/24 1728 95 -- -- -- --    09/23/24 1500 94 -- -- -- --    09/23/24 0931 94 nasal cannula 2 -- --    09/23/24 0800 -- nasal cannula 2 -- --    09/23/24 0600 95 nasal cannula 2 -- --    09/23/24 0400 -- nasal cannula 2 -- --    09/23/24 0151 92 room air -- -- --    09/23/24 0102 90 -- -- -- --    09/23/24 0002 91 -- -- -- --          Current Facility-Administered Medications    Medication Dose Route Frequency Provider Last Rate Last Admin    acetaminophen (TYLENOL) tablet 650 mg  650 mg Oral Q6H PRN Awilda Doty APRN   650 mg at 09/24/24 0914    sennosides-docusate (PERICOLACE) 8.6-50 MG per tablet 2 tablet  2 tablet Oral BID PRN Bhavya Mathias APRN        And    polyethylene glycol (MIRALAX) packet 17 g  17 g Oral Daily PRN Bhavya Mathias APRN        And    bisacodyl (DULCOLAX) EC tablet 5 mg  5 mg Oral Daily PRN Bhavya Mathias APRN        And    bisacodyl (DULCOLAX) suppository 10 mg  10 mg Rectal Daily PRN Bhavya Mathias APRN        budesonide (PULMICORT) nebulizer solution 0.5 mg  0.5 mg Nebulization BID - RT Tash Dias MD   0.5 mg at 09/24/24 0654    doxycycline (VIBRAMYCIN) 100 mg in sodium chloride 0.9 % 100 mL MBP  100 mg Intravenous Q12H Bhavya Mathias APRN   100 mg at 09/24/24 0914    Enoxaparin Sodium (LOVENOX) syringe 40 mg  40 mg Subcutaneous BID Tash Dias MD   40 mg at 09/24/24 0914    guaifenesin (ROBITUSSIN) 100 MG/5ML liquid 200 mg  200 mg Oral Q4H PRN Bhavya Mathias APRN   200 mg at 09/24/24 0913    ipratropium-albuterol (DUO-NEB) nebulizer solution 3 mL  3 mL Nebulization Q4H PRN Bhavya Mathias APRN   3 mL at 09/24/24 0650    melatonin tablet 5 mg  5 mg Oral Nightly PRN Bhavya Mathias APRN   5 mg at 09/23/24 0255    nitroglycerin (NITROSTAT) SL tablet 0.4 mg  0.4 mg Sublingual Q5 Min PRN Bhavya Mathias APRN        ondansetron (ZOFRAN) injection 4 mg  4 mg Intravenous Q6H PRN Bhavya Mathias APRN        promethazine (PHENERGAN) tablet 25 mg  25 mg Oral Q6H PRN Tash Dias MD   25 mg at 09/24/24 0914    sodium chloride 0.9 % flush 10 mL  10 mL Intravenous PRN Travis Olmstead MD        sodium chloride 0.9 % flush 10 mL  10 mL Intravenous Q12H Bhavya Mathias APRN   10 mL at 09/24/24 0914    sodium chloride 0.9 % flush 10 mL  10 mL Intravenous PRN Bhavya Mathias APRN        sodium chloride 0.9 % infusion 40 mL  40 mL Intravenous PRN Stew  KATINA Welch         Lab Results (last 24 hours)       Procedure Component Value Units Date/Time    Basic Metabolic Panel [[id number]]  (Abnormal) Collected: 09/24/24 0043    Specimen: Blood from Arm, Left Updated: 09/24/24 0133     Glucose 111 mg/dL      BUN 6 mg/dL      Creatinine 0.33 mg/dL      Sodium 142 mmol/L      Potassium 4.1 mmol/L      Chloride 104 mmol/L      CO2 29.1 mmol/L      Calcium 8.9 mg/dL      BUN/Creatinine Ratio 18.2     Anion Gap 8.9 mmol/L      eGFR 117.4 mL/min/1.73     Narrative:      GFR Normal >60  Chronic Kidney Disease <60  Kidney Failure <15      CBC & Differential [[id number]]  (Abnormal) Collected: 09/24/24 0043    Specimen: Blood from Arm, Left Updated: 09/24/24 0101    Narrative:      The following orders were created for panel order CBC & Differential.  Procedure                               Abnormality         Status                     ---------                               -----------         ------                     CBC Auto Differential[[id number]]        Abnormal            Final result                 Please view results for these tests on the individual orders.    CBC Auto Differential [[id number]]  (Abnormal) Collected: 09/24/24 0043    Specimen: Blood from Arm, Left Updated: 09/24/24 0101     WBC 9.47 10*3/mm3      RBC 3.75 10*6/mm3      Hemoglobin 11.3 g/dL      Hematocrit 35.5 %      MCV 94.7 fL      MCH 30.1 pg      MCHC 31.8 g/dL      RDW 13.9 %      RDW-SD 48.3 fl      MPV 9.4 fL      Platelets 267 10*3/mm3      Neutrophil % 69.5 %      Lymphocyte % 18.2 %      Monocyte % 9.8 %      Eosinophil % 1.9 %      Basophil % 0.2 %      Immature Grans % 0.4 %      Neutrophils, Absolute 6.58 10*3/mm3      Lymphocytes, Absolute 1.72 10*3/mm3      Monocytes, Absolute 0.93 10*3/mm3      Eosinophils, Absolute 0.18 10*3/mm3      Basophils, Absolute 0.02 10*3/mm3      Immature Grans, Absolute 0.04 10*3/mm3      nRBC 0.0 /100 WBC     Blood Culture - Blood, Arm, Left [[id number]]   (Normal) Collected: 09/22/24 2124    Specimen: Blood from Arm, Left Updated: 09/23/24 2131     Blood Culture No growth at 24 hours    Blood Culture - Blood, Arm, Right [[id number]]  (Normal) Collected: 09/22/24 2105    Specimen: Blood from Arm, Right Updated: 09/23/24 2115     Blood Culture No growth at 24 hours          Imaging Results (Last 48 Hours)       Procedure Component Value Units Date/Time    CT Angiogram Chest Pulmonary Embolism [[id number]] Collected: 09/22/24 2353     Updated: 09/22/24 2359    Narrative:      CT ANGIOGRAM CHEST PULMONARY EMBOLISM    Date of Exam: 9/22/2024 11:16 PM EDT    Indication: Short of breath recent travel to [place].    Comparison: None available.    Technique: Axial CT images were obtained of the chest after the uneventful intravenous administration of iodinated contrast utilizing pulmonary embolism protocol.  Sagittal and coronal reconstructions were performed.  Automated exposure control and   iterative reconstruction methods were used.      Findings:  Hilum and Mediastinum: No pathologically enlarged lymph nodes.  Normal heart size.   No pericardial effusion.  Unremarkable thoracic aorta and pulmonary arteries.    Lung Parenchyma and Pleura: There are diffuse bilateral tree-in-bud airspace opacities greatest in the right lower lobe but seen diffusely throughout both lungs. These findings are consistent with diffuse pneumonia, most likely atypical pneumonia.    Upper Abdomen: There are clips from cholecystectomy. There is mild fatty infiltration of the liver.    Soft tissues: Unremarkable.    Osseous structures: No aggressive focal lytic or sclerotic osseous lesions.      Impression:      Impression:  1.Diffuse bilateral tree-in-bud airspace opacities consistent with pneumonia.  2.No evidence of pulmonary embolus.        Electronically Signed: Gianfranco Pandya MD    9/22/2024 11:57 PM EDT    Workstation ID: [id number]    XR Chest 1 View [[id number]] Collected: 09/22/24 2054      Updated: 09/22/24 2057    Narrative:      XR CHEST 1 VW    Date of Exam: 9/22/2024 8:50 PM EDT    Indication: Short of breath and cough    Comparison: None available.    Findings:  The heart size and pulmonary vascular markings are normal. The left lung is clear. There is right perihilar and right lower lobe airspace disease consistent with pneumonia.      Impression:      Impression:  Right perihilar and right lower lobe pneumonia.        Electronically Signed: Gianfranco Pandya MD    9/22/2024 8:55 PM EDT    Workstation ID: [id number]             Physician Progress Notes (last 24 hours)        Cristy Carreno PA-C at 09/24/24 1128               LOS: 1 day   Patient Care Team:  Loida Valente MD as PCP - General (Family Medicine)  Cielo Birmingham MD as Consulting Physician (Interventional Cardiology)    Subjective     Interval History:    Patient Complaints: Continues to require O2. Reports increased SOB with exertion. Cough productive. Overall she feels improved.    History taken from: patient    Review of Systems   Constitutional:  Positive for fatigue.   HENT:  Negative for trouble swallowing.    Respiratory:  Positive for cough, chest tightness, shortness of breath and wheezing.    Cardiovascular:  Negative for chest pain and leg swelling.   Gastrointestinal:  Negative for diarrhea and nausea.   Genitourinary:  Negative for difficulty urinating.   Musculoskeletal:  Negative for gait problem.   Skin:  Negative for rash.   Neurological:  Negative for weakness.   Psychiatric/Behavioral:  Negative for confusion.            Objective     Vital Signs  Temp:  [97.6 °F (36.4 °C)-98.1 °F (36.7 °C)] 97.6 °F (36.4 °C)  Heart Rate:  [69-86] 84  Resp:  [15-25] 24  BP: (109-140)/(49-79) 109/50    Physical Exam:     General Appearance:    Alert, cooperative, in no acute distress,   Head:    Normocephalic, without obvious abnormality, atraumatic   Eyes:            Lids and lashes normal, conjunctivae and sclerae normal, no    icterus, no pallor   Ears:    Ears appear intact with no abnormalities noted   Throat:   No oral lesions, no thrush, oral mucosa moist   Neck:   No adenopathy, supple, trachea midline, no thyromegaly, no   carotid bruit, no JVD   Lungs:    Mild wheezing and rhonchi present bilaterally, respirations regular, even and  unlabored    Heart:    Regular rhythm and normal rate, normal S1 and S2, no            murmur, no gallop, no rub, no click   Chest Wall:    No abnormalities observed   Abdomen:     Normal bowel sounds, no masses, no organomegaly, soft        Non-tender non-distended, no guarding,   Extremities:   Moves all extremities well, no edema, no cyanosis, no             Redness   Pulses:   Pulses palpable and equal bilaterally   Skin:   No bleeding, bruising or rash                Results Review:    Lab Results (last 24 hours)       Procedure Component Value Units Date/Time    Basic Metabolic Panel [[id number]]  (Abnormal) Collected: 09/24/24 0043    Specimen: Blood from Arm, Left Updated: 09/24/24 0133     Glucose 111 mg/dL      BUN 6 mg/dL      Creatinine 0.33 mg/dL      Sodium 142 mmol/L      Potassium 4.1 mmol/L      Chloride 104 mmol/L      CO2 29.1 mmol/L      Calcium 8.9 mg/dL      BUN/Creatinine Ratio 18.2     Anion Gap 8.9 mmol/L      eGFR 117.4 mL/min/1.73     Narrative:      GFR Normal >60  Chronic Kidney Disease <60  Kidney Failure <15      CBC & Differential [[id number]]  (Abnormal) Collected: 09/24/24 0043    Specimen: Blood from Arm, Left Updated: 09/24/24 0101    Narrative:      The following orders were created for panel order CBC & Differential.  Procedure                               Abnormality         Status                     ---------                               -----------         ------                     CBC Auto Differential[[id number]]        Abnormal            Final result                 Please view results for these tests on the individual orders.    CBC Auto Differential  [[id number]]  (Abnormal) Collected: 09/24/24 0043    Specimen: Blood from Arm, Left Updated: 09/24/24 0101     WBC 9.47 10*3/mm3      RBC 3.75 10*6/mm3      Hemoglobin 11.3 g/dL      Hematocrit 35.5 %      MCV 94.7 fL      MCH 30.1 pg      MCHC 31.8 g/dL      RDW 13.9 %      RDW-SD 48.3 fl      MPV 9.4 fL      Platelets 267 10*3/mm3      Neutrophil % 69.5 %      Lymphocyte % 18.2 %      Monocyte % 9.8 %      Eosinophil % 1.9 %      Basophil % 0.2 %      Immature Grans % 0.4 %      Neutrophils, Absolute 6.58 10*3/mm3      Lymphocytes, Absolute 1.72 10*3/mm3      Monocytes, Absolute 0.93 10*3/mm3      Eosinophils, Absolute 0.18 10*3/mm3      Basophils, Absolute 0.02 10*3/mm3      Immature Grans, Absolute 0.04 10*3/mm3      nRBC 0.0 /100 WBC     Blood Culture - Blood, Arm, Left [[id number]]  (Normal) Collected: 09/22/24 2124    Specimen: Blood from Arm, Left Updated: 09/23/24 2131     Blood Culture No growth at 24 hours    Blood Culture - Blood, Arm, Right [[id number]]  (Normal) Collected: 09/22/24 2105    Specimen: Blood from Arm, Right Updated: 09/23/24 2115     Blood Culture No growth at 24 hours             Imaging Results (Last 24 Hours)       ** No results found for the last 24 hours. **                 I reviewed the patient's new clinical results.    Medication Review:   Scheduled Meds:budesonide, 0.5 mg, Nebulization, BID - RT  doxycycline, 100 mg, Intravenous, Q12H  enoxaparin, 40 mg, Subcutaneous, BID  sodium chloride, 10 mL, Intravenous, Q12H      Continuous Infusions:   PRN Meds:.  acetaminophen    senna-docusate sodium **AND** polyethylene glycol **AND** bisacodyl **AND** bisacodyl    guaifenesin    ipratropium-albuterol    melatonin    nitroglycerin    ondansetron    promethazine    [COMPLETED] Insert Peripheral IV **AND** sodium chloride    sodium chloride    sodium chloride     Assessment & Plan     Acute respiratory distress    Bronchospasm    Pneumonia of both lungs due to Mycoplasma  pneumoniae  -Confirmed with CXR and CT chest, respiratory panel positive for M. Pneumoniae  -WBC improving  -Continue doxycycline, pulmicort nebulizer treatments  -Continue oxygen, will wean to 2L and continue if tolerating    DVT Prophylaxis - Lovenox    Plan for disposition:Home    Cristy Carreno PA-C  09/24/24  11:28 EDT          Electronically signed by Cristy Carreno PA-C at 09/24/24 7459

## 2024-09-24 NOTE — PROGRESS NOTES
LOS: 1 day   Patient Care Team:  Loida Valente MD as PCP - General (Family Medicine)  Cielo Birmingham MD as Consulting Physician (Interventional Cardiology)    Subjective     Interval History:    Patient Complaints: Continues to require O2. Reports increased SOB with exertion. Cough productive. Overall she feels improved.    History taken from: patient    Review of Systems   Constitutional:  Positive for fatigue.   HENT:  Negative for trouble swallowing.    Respiratory:  Positive for cough, chest tightness, shortness of breath and wheezing.    Cardiovascular:  Negative for chest pain and leg swelling.   Gastrointestinal:  Negative for diarrhea and nausea.   Genitourinary:  Negative for difficulty urinating.   Musculoskeletal:  Negative for gait problem.   Skin:  Negative for rash.   Neurological:  Negative for weakness.   Psychiatric/Behavioral:  Negative for confusion.            Objective     Vital Signs  Temp:  [97.6 °F (36.4 °C)-98.1 °F (36.7 °C)] 97.6 °F (36.4 °C)  Heart Rate:  [69-86] 84  Resp:  [15-25] 24  BP: (109-140)/(49-79) 109/50    Physical Exam:     General Appearance:    Alert, cooperative, in no acute distress,   Head:    Normocephalic, without obvious abnormality, atraumatic   Eyes:            Lids and lashes normal, conjunctivae and sclerae normal, no   icterus, no pallor   Ears:    Ears appear intact with no abnormalities noted   Throat:   No oral lesions, no thrush, oral mucosa moist   Neck:   No adenopathy, supple, trachea midline, no thyromegaly, no   carotid bruit, no JVD   Lungs:    Mild wheezing and rhonchi present bilaterally, respirations regular, even and  unlabored    Heart:    Regular rhythm and normal rate, normal S1 and S2, no            murmur, no gallop, no rub, no click   Chest Wall:    No abnormalities observed   Abdomen:     Normal bowel sounds, no masses, no organomegaly, soft        Non-tender non-distended, no guarding,   Extremities:   Moves all extremities well, no  edema, no cyanosis, no             Redness   Pulses:   Pulses palpable and equal bilaterally   Skin:   No bleeding, bruising or rash                Results Review:    Lab Results (last 24 hours)       Procedure Component Value Units Date/Time    Basic Metabolic Panel [[id number]]  (Abnormal) Collected: 09/24/24 0043    Specimen: Blood from Arm, Left Updated: 09/24/24 0133     Glucose 111 mg/dL      BUN 6 mg/dL      Creatinine 0.33 mg/dL      Sodium 142 mmol/L      Potassium 4.1 mmol/L      Chloride 104 mmol/L      CO2 29.1 mmol/L      Calcium 8.9 mg/dL      BUN/Creatinine Ratio 18.2     Anion Gap 8.9 mmol/L      eGFR 117.4 mL/min/1.73     Narrative:      GFR Normal >60  Chronic Kidney Disease <60  Kidney Failure <15      CBC & Differential [[id number]]  (Abnormal) Collected: 09/24/24 0043    Specimen: Blood from Arm, Left Updated: 09/24/24 0101    Narrative:      The following orders were created for panel order CBC & Differential.  Procedure                               Abnormality         Status                     ---------                               -----------         ------                     CBC Auto Differential[[id number]]        Abnormal            Final result                 Please view results for these tests on the individual orders.    CBC Auto Differential [[id number]]  (Abnormal) Collected: 09/24/24 0043    Specimen: Blood from Arm, Left Updated: 09/24/24 0101     WBC 9.47 10*3/mm3      RBC 3.75 10*6/mm3      Hemoglobin 11.3 g/dL      Hematocrit 35.5 %      MCV 94.7 fL      MCH 30.1 pg      MCHC 31.8 g/dL      RDW 13.9 %      RDW-SD 48.3 fl      MPV 9.4 fL      Platelets 267 10*3/mm3      Neutrophil % 69.5 %      Lymphocyte % 18.2 %      Monocyte % 9.8 %      Eosinophil % 1.9 %      Basophil % 0.2 %      Immature Grans % 0.4 %      Neutrophils, Absolute 6.58 10*3/mm3      Lymphocytes, Absolute 1.72 10*3/mm3      Monocytes, Absolute 0.93 10*3/mm3      Eosinophils, Absolute 0.18 10*3/mm3       Basophils, Absolute 0.02 10*3/mm3      Immature Grans, Absolute 0.04 10*3/mm3      nRBC 0.0 /100 WBC     Blood Culture - Blood, Arm, Left [[id number]]  (Normal) Collected: 09/22/24 2124    Specimen: Blood from Arm, Left Updated: 09/23/24 2131     Blood Culture No growth at 24 hours    Blood Culture - Blood, Arm, Right [[id number]]  (Normal) Collected: 09/22/24 2105    Specimen: Blood from Arm, Right Updated: 09/23/24 2115     Blood Culture No growth at 24 hours             Imaging Results (Last 24 Hours)       ** No results found for the last 24 hours. **                 I reviewed the patient's new clinical results.    Medication Review:   Scheduled Meds:budesonide, 0.5 mg, Nebulization, BID - RT  doxycycline, 100 mg, Intravenous, Q12H  enoxaparin, 40 mg, Subcutaneous, BID  sodium chloride, 10 mL, Intravenous, Q12H      Continuous Infusions:   PRN Meds:.  acetaminophen    senna-docusate sodium **AND** polyethylene glycol **AND** bisacodyl **AND** bisacodyl    guaifenesin    ipratropium-albuterol    melatonin    nitroglycerin    ondansetron    promethazine    [COMPLETED] Insert Peripheral IV **AND** sodium chloride    sodium chloride    sodium chloride     Assessment & Plan     Acute respiratory distress    Bronchospasm    Pneumonia of both lungs due to Mycoplasma pneumoniae  -Confirmed with CXR and CT chest, respiratory panel positive for M. Pneumoniae  -WBC improving  -Continue doxycycline, pulmicort nebulizer treatments  -Continue oxygen, will wean to 2L and continue if tolerating    DVT Prophylaxis - Lovenox    Plan for disposition:Home    Cristy Carreno PA-C  09/24/24  11:28 EDT

## 2024-09-24 NOTE — PLAN OF CARE
Goal Outcome Evaluation:  Plan of Care Reviewed With: patient        Progress: no change  Outcome Evaluation: Patient alert and oriented, continues O2@2liters via nc, continues on IV ABX and mucinex, patient continues to have productive cough, patient up to BSC, SOA with excertion, patient has rested well this shift, call light within reach.

## 2024-09-25 LAB
ALBUMIN SERPL-MCNC: 3.2 G/DL (ref 3.5–5.2)
ALBUMIN/GLOB SERPL: 1.1 G/DL
ALP SERPL-CCNC: 125 U/L (ref 39–117)
ALT SERPL W P-5'-P-CCNC: 39 U/L (ref 1–33)
ANION GAP SERPL CALCULATED.3IONS-SCNC: 7 MMOL/L (ref 5–15)
AST SERPL-CCNC: 27 U/L (ref 1–32)
BILIRUB SERPL-MCNC: 0.3 MG/DL (ref 0–1.2)
BUN SERPL-MCNC: 9 MG/DL (ref 8–23)
BUN/CREAT SERPL: 15 (ref 7–25)
CALCIUM SPEC-SCNC: 9.1 MG/DL (ref 8.6–10.5)
CHLORIDE SERPL-SCNC: 105 MMOL/L (ref 98–107)
CO2 SERPL-SCNC: 30 MMOL/L (ref 22–29)
CREAT SERPL-MCNC: 0.6 MG/DL (ref 0.57–1)
DEPRECATED RDW RBC AUTO: 48.1 FL (ref 37–54)
EGFRCR SERPLBLD CKD-EPI 2021: 101.6 ML/MIN/1.73
EOSINOPHIL # BLD MANUAL: 0.14 10*3/MM3 (ref 0–0.4)
EOSINOPHIL NFR BLD MANUAL: 2 % (ref 0.3–6.2)
ERYTHROCYTE [DISTWIDTH] IN BLOOD BY AUTOMATED COUNT: 13.7 % (ref 12.3–15.4)
GLOBULIN UR ELPH-MCNC: 2.8 GM/DL
GLUCOSE SERPL-MCNC: 95 MG/DL (ref 65–99)
HCT VFR BLD AUTO: 34.9 % (ref 34–46.6)
HGB BLD-MCNC: 11.4 G/DL (ref 12–15.9)
LYMPHOCYTES # BLD MANUAL: 2.44 10*3/MM3 (ref 0.7–3.1)
LYMPHOCYTES NFR BLD MANUAL: 6 % (ref 5–12)
MCH RBC QN AUTO: 31.1 PG (ref 26.6–33)
MCHC RBC AUTO-ENTMCNC: 32.7 G/DL (ref 31.5–35.7)
MCV RBC AUTO: 95.4 FL (ref 79–97)
METAMYELOCYTES NFR BLD MANUAL: 1 % (ref 0–0)
MONOCYTES # BLD: 0.43 10*3/MM3 (ref 0.1–0.9)
NEUTROPHILS # BLD AUTO: 4.09 10*3/MM3 (ref 1.7–7)
NEUTROPHILS NFR BLD MANUAL: 57 % (ref 42.7–76)
PLATELET # BLD AUTO: 283 10*3/MM3 (ref 140–450)
PMV BLD AUTO: 9.1 FL (ref 6–12)
POTASSIUM SERPL-SCNC: 4 MMOL/L (ref 3.5–5.2)
PROT SERPL-MCNC: 6 G/DL (ref 6–8.5)
RBC # BLD AUTO: 3.66 10*6/MM3 (ref 3.77–5.28)
RBC MORPH BLD: NORMAL
SCAN SLIDE: NORMAL
SMALL PLATELETS BLD QL SMEAR: ADEQUATE
SODIUM SERPL-SCNC: 142 MMOL/L (ref 136–145)
VARIANT LYMPHS NFR BLD MANUAL: 3 % (ref 0–5)
VARIANT LYMPHS NFR BLD MANUAL: 31 % (ref 19.6–45.3)
WBC MORPH BLD: NORMAL
WBC NRBC COR # BLD AUTO: 7.18 10*3/MM3 (ref 3.4–10.8)

## 2024-09-25 PROCEDURE — 94799 UNLISTED PULMONARY SVC/PX: CPT

## 2024-09-25 PROCEDURE — 94664 DEMO&/EVAL PT USE INHALER: CPT

## 2024-09-25 PROCEDURE — 85025 COMPLETE CBC W/AUTO DIFF WBC: CPT | Performed by: PHYSICIAN ASSISTANT

## 2024-09-25 PROCEDURE — 80053 COMPREHEN METABOLIC PANEL: CPT | Performed by: PHYSICIAN ASSISTANT

## 2024-09-25 PROCEDURE — 25010000002 ENOXAPARIN PER 10 MG: Performed by: INTERNAL MEDICINE

## 2024-09-25 PROCEDURE — 63710000001 PROMETHAZINE PER 25 MG: Performed by: INTERNAL MEDICINE

## 2024-09-25 PROCEDURE — 85007 BL SMEAR W/DIFF WBC COUNT: CPT | Performed by: PHYSICIAN ASSISTANT

## 2024-09-25 PROCEDURE — 94761 N-INVAS EAR/PLS OXIMETRY MLT: CPT

## 2024-09-25 RX ADMIN — Medication 10 ML: at 10:14

## 2024-09-25 RX ADMIN — DOXYCYCLINE 100 MG: 100 INJECTION, POWDER, LYOPHILIZED, FOR SOLUTION INTRAVENOUS at 10:08

## 2024-09-25 RX ADMIN — Medication 10 ML: at 21:24

## 2024-09-25 RX ADMIN — ENOXAPARIN SODIUM 40 MG: 100 INJECTION SUBCUTANEOUS at 09:58

## 2024-09-25 RX ADMIN — DOXYCYCLINE 100 MG: 100 INJECTION, POWDER, LYOPHILIZED, FOR SOLUTION INTRAVENOUS at 21:24

## 2024-09-25 RX ADMIN — ENOXAPARIN SODIUM 40 MG: 100 INJECTION SUBCUTANEOUS at 21:23

## 2024-09-25 RX ADMIN — BUDESONIDE 0.5 MG: 0.5 INHALANT RESPIRATORY (INHALATION) at 19:29

## 2024-09-25 RX ADMIN — BUDESONIDE 0.5 MG: 0.5 INHALANT RESPIRATORY (INHALATION) at 08:04

## 2024-09-25 RX ADMIN — PROMETHAZINE HYDROCHLORIDE 25 MG: 25 TABLET ORAL at 22:26

## 2024-09-25 RX ADMIN — GUAIFENESIN 200 MG: 200 SOLUTION ORAL at 22:26

## 2024-09-25 RX ADMIN — ACETAMINOPHEN 650 MG: 325 TABLET, FILM COATED ORAL at 07:39

## 2024-09-25 NOTE — PROGRESS NOTES
LOS: 2 days   Patient Care Team:  Loida Valente MD as PCP - General (Family Medicine)  Cielo Birmingham MD as Consulting Physician (Interventional Cardiology)    Subjective     Interval History:    Patient Complaints: Continues to require O2.  Starting to feel somewhat better but continues with exertional shortness of breath and wheeze    History taken from: patient    Review of Systems   Constitutional:  Positive for fatigue.   HENT:  Negative for trouble swallowing.    Respiratory:  Positive for cough, chest tightness, shortness of breath and wheezing.    Cardiovascular:  Negative for chest pain and leg swelling.   Gastrointestinal:  Negative for diarrhea and nausea.   Genitourinary:  Negative for difficulty urinating.   Musculoskeletal:  Negative for gait problem.   Skin:  Negative for rash.   Neurological:  Negative for weakness.   Psychiatric/Behavioral:  Negative for confusion.            Objective     Vital Signs  Temp:  [97.4 °F (36.3 °C)-98.1 °F (36.7 °C)] 97.8 °F (36.6 °C)  Heart Rate:  [70-84] 70  Resp:  [18-25] 20  BP: (109-127)/(50-73) 127/63    Physical Exam:     General Appearance:    Alert, cooperative, in no acute distress,   Head:    Normocephalic, without obvious abnormality, atraumatic   Eyes:            Lids and lashes normal, conjunctivae and sclerae normal, no   icterus, no pallor   Ears:    Ears appear intact with no abnormalities noted   Throat:   No oral lesions, no thrush, oral mucosa moist   Neck:   No adenopathy, supple, trachea midline, no thyromegaly, no   carotid bruit, no JVD   Lungs:    Mild wheezing and rhonchi present bilaterally, respirations regular, even and  unlabored    Heart:    Regular rhythm and normal rate, normal S1 and S2, no            murmur, no gallop, no rub, no click   Chest Wall:    No abnormalities observed   Abdomen:     Normal bowel sounds, no masses, no organomegaly, soft        Non-tender non-distended, no guarding,   Extremities:   Moves all  extremities well, no edema, no cyanosis, no             Redness   Pulses:   Pulses palpable and equal bilaterally   Skin:   No bleeding, bruising or rash                Results Review:    Lab Results (last 24 hours)       Procedure Component Value Units Date/Time    CBC & Differential [[id number]]  (Abnormal) Collected: 09/25/24 0234    Specimen: Blood from Hand, Right Updated: 09/25/24 0320    Narrative:      The following orders were created for panel order CBC & Differential.  Procedure                               Abnormality         Status                     ---------                               -----------         ------                     CBC Auto Differential[[id number]]        Abnormal            Final result               Scan Slide[[id number]]                                       Final result                 Please view results for these tests on the individual orders.    CBC Auto Differential [[id number]]  (Abnormal) Collected: 09/25/24 0234    Specimen: Blood from Hand, Right Updated: 09/25/24 0320     WBC 7.18 10*3/mm3      RBC 3.66 10*6/mm3      Hemoglobin 11.4 g/dL      Hematocrit 34.9 %      MCV 95.4 fL      MCH 31.1 pg      MCHC 32.7 g/dL      RDW 13.7 %      RDW-SD 48.1 fl      MPV 9.1 fL      Platelets 283 10*3/mm3     Narrative:      The previously reported component NRBC is no longer being reported. Previous result was 0.0 /100 WBC (Reference Range: 0.0-0.2 /100 WBC) on 9/25/2024 at [facility] EDT.    Scan Slide [[id number]] Collected: 09/25/24 0234    Specimen: Blood from Hand, Right Updated: 09/25/24 0320     Scan Slide --     Comment: See Manual Differential Results       Manual Differential [[id number]]  (Abnormal) Collected: 09/25/24 0234    Specimen: Blood from Hand, Right Updated: 09/25/24 0320     Neutrophil % 57.0 %      Lymphocyte % 31.0 %      Monocyte % 6.0 %      Eosinophil % 2.0 %      Metamyelocyte % 1.0 %      Atypical Lymphocyte % 3.0 %      Neutrophils Absolute 4.09 10*3/mm3       Lymphocytes Absolute 2.44 10*3/mm3      Monocytes Absolute 0.43 10*3/mm3      Eosinophils Absolute 0.14 10*3/mm3      RBC Morphology Normal     WBC Morphology Normal     Platelet Estimate Adequate    Comprehensive Metabolic Panel [[id number]]  (Abnormal) Collected: 09/25/24 0234    Specimen: Blood from Hand, Right Updated: 09/25/24 0302     Glucose 95 mg/dL      BUN 9 mg/dL      Creatinine 0.60 mg/dL      Sodium 142 mmol/L      Potassium 4.0 mmol/L      Chloride 105 mmol/L      CO2 30.0 mmol/L      Calcium 9.1 mg/dL      Total Protein 6.0 g/dL      Albumin 3.2 g/dL      ALT (SGPT) 39 U/L      AST (SGOT) 27 U/L      Alkaline Phosphatase 125 U/L      Total Bilirubin 0.3 mg/dL      Globulin 2.8 gm/dL      A/G Ratio 1.1 g/dL      BUN/Creatinine Ratio 15.0     Anion Gap 7.0 mmol/L      eGFR 101.6 mL/min/1.73     Narrative:      GFR Normal >60  Chronic Kidney Disease <60  Kidney Failure <15      Blood Culture - Blood, Arm, Left [[id number]]  (Normal) Collected: 09/22/24 2124    Specimen: Blood from Arm, Left Updated: 09/24/24 2131     Blood Culture No growth at 2 days    Blood Culture - Blood, Arm, Right [[id number]]  (Normal) Collected: 09/22/24 2105    Specimen: Blood from Arm, Right Updated: 09/24/24 2115     Blood Culture No growth at 2 days             Imaging Results (Last 24 Hours)       ** No results found for the last 24 hours. **                 I reviewed the patient's new clinical results.    Medication Review:   Scheduled Meds:budesonide, 0.5 mg, Nebulization, BID - RT  doxycycline, 100 mg, Intravenous, Q12H  enoxaparin, 40 mg, Subcutaneous, BID  sodium chloride, 10 mL, Intravenous, Q12H      Continuous Infusions:   PRN Meds:.  acetaminophen    senna-docusate sodium **AND** polyethylene glycol **AND** bisacodyl **AND** bisacodyl    guaifenesin    ipratropium-albuterol    melatonin    nitroglycerin    ondansetron    promethazine    [COMPLETED] Insert Peripheral IV **AND** sodium chloride    sodium chloride     sodium chloride     Assessment & Plan       Acute respiratory distress  Bronchospasm  Pneumonia of both lungs due to Mycoplasma pneumoniae  -Confirmed with CXR and CT chest, respiratory panel positive for M. Pneumoniae  -WBC improving  -Continue doxycycline, pulmicort nebulizer treatments  -Continue oxygen, will wean to 2L and continue if tolerating    DVT Prophylaxis - Lovenox    Plan for disposition:Home    Johnna Cifuentes, KATINA  09/25/24  09:31 EDT

## 2024-09-25 NOTE — PLAN OF CARE
Goal Outcome Evaluation:                   Problem: Adult Inpatient Plan of Care  Goal: Plan of Care Review  Outcome: Progressing  Goal: Patient-Specific Goal (Individualized)  Outcome: Progressing  Goal: Absence of Hospital-Acquired Illness or Injury  Outcome: Progressing  Intervention: Identify and Manage Fall Risk  Recent Flowsheet Documentation  Taken 9/25/2024 0400 by Ender Jarrett, RN  Safety Promotion/Fall Prevention:   safety round/check completed   room organization consistent   nonskid shoes/slippers when out of bed   lighting adjusted   assistive device/personal items within reach   clutter free environment maintained   fall prevention program maintained  Taken 9/25/2024 0200 by Ender Jarrett, RN  Safety Promotion/Fall Prevention:   safety round/check completed   room organization consistent   nonskid shoes/slippers when out of bed   lighting adjusted   assistive device/personal items within reach   clutter free environment maintained   fall prevention program maintained  Taken 9/25/2024 0000 by Ender Jarrett, RN  Safety Promotion/Fall Prevention:   safety round/check completed   room organization consistent   nonskid shoes/slippers when out of bed   lighting adjusted   assistive device/personal items within reach   clutter free environment maintained   fall prevention program maintained  Taken 9/24/2024 2200 by Ender Jarrett, RN  Safety Promotion/Fall Prevention:   safety round/check completed   room organization consistent   nonskid shoes/slippers when out of bed   lighting adjusted   assistive device/personal items within reach   clutter free environment maintained   fall prevention program maintained  Taken 9/24/2024 2000 by Ender Jarrett, RN  Safety Promotion/Fall Prevention:   safety round/check completed   room organization consistent   nonskid shoes/slippers when out of bed   lighting adjusted   assistive device/personal items within reach   clutter free environment maintained   fall  prevention program maintained  Intervention: Prevent Skin Injury  Recent Flowsheet Documentation  Taken 9/25/2024 0400 by Ender Jarrett RN  Body Position: position changed independently  Skin Protection:   adhesive use limited   incontinence pads utilized   tubing/devices free from skin contact  Taken 9/25/2024 0000 by Ender Jarrett RN  Body Position: position changed independently  Taken 9/24/2024 2000 by Ender Jarrett RN  Body Position: position changed independently  Skin Protection:   adhesive use limited   incontinence pads utilized   tubing/devices free from skin contact  Intervention: Prevent and Manage VTE (Venous Thromboembolism) Risk  Recent Flowsheet Documentation  Taken 9/25/2024 0400 by Ender Jarrett RN  Range of Motion: active ROM (range of motion) encouraged  Taken 9/24/2024 2000 by Ender Jarrett RN  VTE Prevention/Management: (educated)   patient refused intervention   sequential compression devices off   bilateral   other (see comments)  Range of Motion: active ROM (range of motion) encouraged  Intervention: Prevent Infection  Recent Flowsheet Documentation  Taken 9/25/2024 0400 by Ender aJrrett RN  Infection Prevention:   equipment surfaces disinfected   hand hygiene promoted   personal protective equipment utilized   rest/sleep promoted   single patient room provided  Taken 9/25/2024 0200 by Ender Jarrett RN  Infection Prevention:   equipment surfaces disinfected   hand hygiene promoted   personal protective equipment utilized   rest/sleep promoted   single patient room provided  Taken 9/25/2024 0000 by Ender Jarrett RN  Infection Prevention:   equipment surfaces disinfected   hand hygiene promoted   personal protective equipment utilized   rest/sleep promoted   single patient room provided  Taken 9/24/2024 2200 by Ender Jarrett RN  Infection Prevention:   equipment surfaces disinfected   hand hygiene promoted   personal protective equipment utilized   rest/sleep  promoted   single patient room provided  Taken 9/24/2024 2000 by Ender Jarrett, RN  Infection Prevention:   equipment surfaces disinfected   hand hygiene promoted   personal protective equipment utilized   rest/sleep promoted   single patient room provided  Goal: Optimal Comfort and Wellbeing  Outcome: Progressing  Intervention: Monitor Pain and Promote Comfort  Recent Flowsheet Documentation  Taken 9/25/2024 0400 by Ender Jarrett RN  Pain Management Interventions:   care clustered   pillow support provided   position adjusted   quiet environment facilitated  Taken 9/24/2024 2000 by Ender Jarrett RN  Pain Management Interventions:   care clustered   pillow support provided   position adjusted   quiet environment facilitated   see MAR  Intervention: Provide Person-Centered Care  Recent Flowsheet Documentation  Taken 9/25/2024 0400 by Ender Jarrett RN  Trust Relationship/Rapport:   care explained   choices provided   emotional support provided   empathic listening provided   questions answered   questions encouraged   reassurance provided   thoughts/feelings acknowledged  Taken 9/24/2024 2000 by Ender Jarrett RN  Trust Relationship/Rapport:   care explained   choices provided   emotional support provided   empathic listening provided   questions answered   questions encouraged   reassurance provided   thoughts/feelings acknowledged  Goal: Readiness for Transition of Care  Outcome: Progressing     Problem: Infection (Pneumonia)  Goal: Resolution of Infection Signs and Symptoms  Outcome: Progressing  Intervention: Prevent Infection Progression  Recent Flowsheet Documentation  Taken 9/25/2024 0400 by Ender Jarrett RN  Isolation Precautions:   precautions maintained   droplet  Taken 9/25/2024 0200 by Ender Jarrett RN  Isolation Precautions:   precautions maintained   droplet  Taken 9/25/2024 0000 by Ender Jarrett RN  Isolation Precautions:   precautions maintained   droplet  Taken 9/24/2024 2200 by  Ender Jarrett RN  Isolation Precautions:   precautions maintained   droplet  Taken 9/24/2024 2000 by Ender Jarrett RN  Isolation Precautions:   precautions maintained   droplet     Problem: Infection  Goal: Absence of Infection Signs and Symptoms  Outcome: Progressing  Intervention: Prevent or Manage Infection  Recent Flowsheet Documentation  Taken 9/25/2024 0400 by Ender Jarrett RN  Isolation Precautions:   precautions maintained   droplet  Taken 9/25/2024 0200 by Ender Jarrett RN  Isolation Precautions:   precautions maintained   droplet  Taken 9/25/2024 0000 by Ender Jarrett RN  Isolation Precautions:   precautions maintained   droplet  Taken 9/24/2024 2200 by Ender Jarrett RN  Isolation Precautions:   precautions maintained   droplet  Taken 9/24/2024 2000 by Ender Jarrett RN  Isolation Precautions:   precautions maintained   droplet

## 2024-09-25 NOTE — CASE MANAGEMENT/SOCIAL WORK
Continued Stay Note  ERLINDA Segovia     Patient Name: Kelly Garcia  MRN: [id number]  Today's Date: 9/25/2024    Admit Date: 9/22/2024    Plan: Anticipate routine home with daughters. Watch O2 needs.   Discharge Plan       Row Name 09/25/24 6877       Plan    Plan Comments DC Barriers: IV abx, Noted to be on 2L O2 with no home O2. May require walking oximetry to be performed 24-48 hrs prior to d/c to determine any home O2 needs.                  Paloma Matamoros RN     Office Phone: [phone]  Office Cell: [phone]

## 2024-09-25 NOTE — PLAN OF CARE
Goal Outcome Evaluation:              Problem: Adult Inpatient Plan of Care  Goal: Plan of Care Review  Outcome: Progressing  Goal: Patient-Specific Goal (Individualized)  Outcome: Progressing  Goal: Absence of Hospital-Acquired Illness or Injury  Outcome: Progressing  Intervention: Identify and Manage Fall Risk  Recent Flowsheet Documentation  Taken 9/25/2024 1646 by Nanci Gamboa RN  Safety Promotion/Fall Prevention:   activity supervised   clutter free environment maintained   assistive device/personal items within reach   fall prevention program maintained   nonskid shoes/slippers when out of bed   room organization consistent   safety round/check completed  Taken 9/25/2024 1415 by Nanci Gamboa RN  Safety Promotion/Fall Prevention:   activity supervised   assistive device/personal items within reach   clutter free environment maintained   fall prevention program maintained   nonskid shoes/slippers when out of bed   room organization consistent   safety round/check completed  Taken 9/25/2024 1235 by Nanci Gamboa RN  Safety Promotion/Fall Prevention:   activity supervised   assistive device/personal items within reach   clutter free environment maintained   fall prevention program maintained   nonskid shoes/slippers when out of bed   room organization consistent   safety round/check completed  Taken 9/25/2024 1020 by Nanci Gamboa RN  Safety Promotion/Fall Prevention:   activity supervised   assistive device/personal items within reach   clutter free environment maintained   fall prevention program maintained   nonskid shoes/slippers when out of bed   room organization consistent   safety round/check completed  Taken 9/25/2024 0830 by Nanci Gamboa RN  Safety Promotion/Fall Prevention:   activity supervised   assistive device/personal items within reach   clutter free environment maintained   fall prevention program maintained   nonskid shoes/slippers when out of bed   room organization  consistent   safety round/check completed  Intervention: Prevent Skin Injury  Recent Flowsheet Documentation  Taken 9/25/2024 1646 by Nanci Gamboa RN  Body Position: position changed independently  Skin Protection: adhesive use limited  Taken 9/25/2024 1235 by Nanci Gamboa RN  Body Position: position changed independently  Skin Protection: adhesive use limited  Taken 9/25/2024 0830 by Nanci Gamboa RN  Body Position: position changed independently  Skin Protection: adhesive use limited  Intervention: Prevent and Manage VTE (Venous Thromboembolism) Risk  Recent Flowsheet Documentation  Taken 9/25/2024 1646 by Nanci Gamboa RN  Activity Management: activity encouraged  VTE Prevention/Management: patient refused intervention  Range of Motion: active ROM (range of motion) encouraged  Taken 9/25/2024 1235 by Nanci Gamboa RN  Activity Management: activity encouraged  VTE Prevention/Management: patient refused intervention  Range of Motion: active ROM (range of motion) encouraged  Taken 9/25/2024 0830 by Nanci Gamboa RN  Activity Management: activity encouraged  VTE Prevention/Management: patient refused intervention  Range of Motion: active ROM (range of motion) encouraged  Intervention: Prevent Infection  Recent Flowsheet Documentation  Taken 9/25/2024 1646 by Nanci Gamboa RN  Infection Prevention:   hand hygiene promoted   personal protective equipment utilized   rest/sleep promoted   single patient room provided   environmental surveillance performed  Taken 9/25/2024 1415 by Nanci Gamboa RN  Infection Prevention:   hand hygiene promoted   personal protective equipment utilized   rest/sleep promoted   single patient room provided   environmental surveillance performed  Taken 9/25/2024 1235 by Nacni Gamboa RN  Infection Prevention:   personal protective equipment utilized   rest/sleep promoted   hand hygiene promoted   single patient room provided   environmental surveillance  performed  Taken 9/25/2024 1020 by Nanci Gamoba RN  Infection Prevention:   hand hygiene promoted   personal protective equipment utilized   rest/sleep promoted   single patient room provided   environmental surveillance performed  Taken 9/25/2024 0830 by Nanci Gamboa RN  Infection Prevention:   hand hygiene promoted   personal protective equipment utilized   rest/sleep promoted   single patient room provided   environmental surveillance performed  Goal: Optimal Comfort and Wellbeing  Outcome: Progressing  Intervention: Monitor Pain and Promote Comfort  Recent Flowsheet Documentation  Taken 9/25/2024 0739 by Nanci Gamboa RN  Pain Management Interventions: see MAR  Intervention: Provide Person-Centered Care  Recent Flowsheet Documentation  Taken 9/25/2024 1646 by Nanci Gamboa RN  Trust Relationship/Rapport:   care explained   choices provided  Taken 9/25/2024 0830 by Nanci Gamboa RN  Trust Relationship/Rapport:   care explained   choices provided  Goal: Readiness for Transition of Care  Outcome: Progressing     Problem: Infection (Pneumonia)  Goal: Resolution of Infection Signs and Symptoms  Outcome: Progressing  Intervention: Prevent Infection Progression  Recent Flowsheet Documentation  Taken 9/25/2024 1646 by Nanci Gamboa RN  Isolation Precautions: precautions maintained  Taken 9/25/2024 1415 by Nanci Gamboa RN  Isolation Precautions: precautions maintained  Taken 9/25/2024 1235 by Nanci Gamboa RN  Isolation Precautions: precautions maintained  Taken 9/25/2024 1020 by Nanci Gamboa RN  Isolation Precautions: precautions maintained  Taken 9/25/2024 0830 by Nanci Gamboa RN  Isolation Precautions: precautions maintained     Problem: Infection (Pneumonia)  Goal: Resolution of Infection Signs and Symptoms  Outcome: Progressing  Intervention: Prevent Infection Progression  Recent Flowsheet Documentation  Taken 9/25/2024 1646 by Nanci Gamboa RN  Isolation Precautions:  precautions maintained  Taken 9/25/2024 1415 by Nanci Gamboa RN  Isolation Precautions: precautions maintained  Taken 9/25/2024 1235 by Nanci Gamboa RN  Isolation Precautions: precautions maintained  Taken 9/25/2024 1020 by Nanci Gamboa RN  Isolation Precautions: precautions maintained  Taken 9/25/2024 0830 by Nanci Gamboa RN  Isolation Precautions: precautions maintained

## 2024-09-26 VITALS
DIASTOLIC BLOOD PRESSURE: 79 MMHG | TEMPERATURE: 97.8 F | HEART RATE: 76 BPM | OXYGEN SATURATION: 94 % | BODY MASS INDEX: 44.38 KG/M2 | SYSTOLIC BLOOD PRESSURE: 129 MMHG | RESPIRATION RATE: 23 BRPM | WEIGHT: 259.92 LBS | HEIGHT: 64 IN

## 2024-09-26 LAB
ALBUMIN SERPL-MCNC: 3.3 G/DL (ref 3.5–5.2)
ALBUMIN/GLOB SERPL: 1.2 G/DL
ALP SERPL-CCNC: 112 U/L (ref 39–117)
ALT SERPL W P-5'-P-CCNC: 30 U/L (ref 1–33)
ANION GAP SERPL CALCULATED.3IONS-SCNC: 8.8 MMOL/L (ref 5–15)
AST SERPL-CCNC: 25 U/L (ref 1–32)
BASOPHILS # BLD AUTO: 0.02 10*3/MM3 (ref 0–0.2)
BASOPHILS NFR BLD AUTO: 0.3 % (ref 0–1.5)
BILIRUB SERPL-MCNC: 0.2 MG/DL (ref 0–1.2)
BUN SERPL-MCNC: 10 MG/DL (ref 8–23)
BUN/CREAT SERPL: 20.4 (ref 7–25)
CALCIUM SPEC-SCNC: 8.9 MG/DL (ref 8.6–10.5)
CHLORIDE SERPL-SCNC: 104 MMOL/L (ref 98–107)
CO2 SERPL-SCNC: 29.2 MMOL/L (ref 22–29)
CREAT SERPL-MCNC: 0.49 MG/DL (ref 0.57–1)
DEPRECATED RDW RBC AUTO: 46.4 FL (ref 37–54)
EGFRCR SERPLBLD CKD-EPI 2021: 106.7 ML/MIN/1.73
EOSINOPHIL # BLD AUTO: 0.22 10*3/MM3 (ref 0–0.4)
EOSINOPHIL NFR BLD AUTO: 3.3 % (ref 0.3–6.2)
ERYTHROCYTE [DISTWIDTH] IN BLOOD BY AUTOMATED COUNT: 13.6 % (ref 12.3–15.4)
GLOBULIN UR ELPH-MCNC: 2.8 GM/DL
GLUCOSE SERPL-MCNC: 91 MG/DL (ref 65–99)
HCT VFR BLD AUTO: 34.4 % (ref 34–46.6)
HGB BLD-MCNC: 11.2 G/DL (ref 12–15.9)
IMM GRANULOCYTES # BLD AUTO: 0.08 10*3/MM3 (ref 0–0.05)
IMM GRANULOCYTES NFR BLD AUTO: 1.2 % (ref 0–0.5)
LYMPHOCYTES # BLD AUTO: 2.3 10*3/MM3 (ref 0.7–3.1)
LYMPHOCYTES NFR BLD AUTO: 34.7 % (ref 19.6–45.3)
MCH RBC QN AUTO: 31.1 PG (ref 26.6–33)
MCHC RBC AUTO-ENTMCNC: 32.6 G/DL (ref 31.5–35.7)
MCV RBC AUTO: 95.6 FL (ref 79–97)
MONOCYTES # BLD AUTO: 0.59 10*3/MM3 (ref 0.1–0.9)
MONOCYTES NFR BLD AUTO: 8.9 % (ref 5–12)
NEUTROPHILS NFR BLD AUTO: 3.41 10*3/MM3 (ref 1.7–7)
NEUTROPHILS NFR BLD AUTO: 51.6 % (ref 42.7–76)
NRBC BLD AUTO-RTO: 0 /100 WBC (ref 0–0.2)
PLATELET # BLD AUTO: 280 10*3/MM3 (ref 140–450)
PMV BLD AUTO: 9.8 FL (ref 6–12)
POTASSIUM SERPL-SCNC: 3.9 MMOL/L (ref 3.5–5.2)
PROT SERPL-MCNC: 6.1 G/DL (ref 6–8.5)
RBC # BLD AUTO: 3.6 10*6/MM3 (ref 3.77–5.28)
SODIUM SERPL-SCNC: 142 MMOL/L (ref 136–145)
WBC NRBC COR # BLD AUTO: 6.62 10*3/MM3 (ref 3.4–10.8)

## 2024-09-26 PROCEDURE — 94799 UNLISTED PULMONARY SVC/PX: CPT

## 2024-09-26 PROCEDURE — 80053 COMPREHEN METABOLIC PANEL: CPT | Performed by: PHYSICIAN ASSISTANT

## 2024-09-26 PROCEDURE — 25010000002 ENOXAPARIN PER 10 MG: Performed by: INTERNAL MEDICINE

## 2024-09-26 PROCEDURE — 85025 COMPLETE CBC W/AUTO DIFF WBC: CPT | Performed by: PHYSICIAN ASSISTANT

## 2024-09-26 PROCEDURE — 94664 DEMO&/EVAL PT USE INHALER: CPT

## 2024-09-26 PROCEDURE — 94761 N-INVAS EAR/PLS OXIMETRY MLT: CPT

## 2024-09-26 RX ORDER — DOXYCYCLINE 100 MG/1
100 CAPSULE ORAL 2 TIMES DAILY
Qty: 8 CAPSULE | Refills: 0 | Status: SHIPPED | OUTPATIENT
Start: 2024-09-26

## 2024-09-26 RX ORDER — ALBUTEROL SULFATE 90 UG/1
2 INHALANT RESPIRATORY (INHALATION) EVERY 4 HOURS PRN
Qty: 17 G | Refills: 0 | Status: SHIPPED | OUTPATIENT
Start: 2024-09-26

## 2024-09-26 RX ORDER — ACETAMINOPHEN 325 MG/1
650 TABLET ORAL EVERY 6 HOURS PRN
Start: 2024-09-26

## 2024-09-26 RX ORDER — GUAIFENESIN 200 MG/10ML
200 LIQUID ORAL EVERY 4 HOURS PRN
Start: 2024-09-26

## 2024-09-26 RX ORDER — PROMETHAZINE HYDROCHLORIDE 25 MG/1
25 TABLET ORAL EVERY 6 HOURS PRN
Qty: 10 TABLET | Refills: 0 | Status: SHIPPED | OUTPATIENT
Start: 2024-09-26

## 2024-09-26 RX ADMIN — ENOXAPARIN SODIUM 40 MG: 100 INJECTION SUBCUTANEOUS at 09:04

## 2024-09-26 RX ADMIN — ACETAMINOPHEN 650 MG: 325 TABLET, FILM COATED ORAL at 05:50

## 2024-09-26 RX ADMIN — DOXYCYCLINE 100 MG: 100 INJECTION, POWDER, LYOPHILIZED, FOR SOLUTION INTRAVENOUS at 09:04

## 2024-09-26 RX ADMIN — Medication 10 ML: at 09:04

## 2024-09-26 RX ADMIN — BUDESONIDE 0.5 MG: 0.5 INHALANT RESPIRATORY (INHALATION) at 07:16

## 2024-09-26 NOTE — PLAN OF CARE
Goal Outcome Evaluation:              Outcome Evaluation: Pt a&ox4. On room air. No complaints of pain. Anticipate discharge this afternoon.

## 2024-09-26 NOTE — CASE MANAGEMENT/SOCIAL WORK
Case Management Discharge Note      Final Note: Home with family    Provided Post Acute Provider List?: N/A    Selected Continued Care - Admitted Since 9/22/2024          Transportation Services  Private: Car    Final Discharge Disposition Code: 01 - home or self-care

## 2024-09-26 NOTE — DISCHARGE SUMMARY
Date of Discharge:  9/26/2024    Discharge Diagnosis: Pneumonia due to mycoplasma pneumoniae    Presenting Problem/History of Present Illness  Active Hospital Problems    Diagnosis  POA    **Pneumonia of right lower lobe due to infectious organism [J18.9]  Yes    Acute respiratory distress [R06.03]  Yes    Bronchospasm [J98.01]  Yes    Pneumonia of both lungs due to infectious organism [J18.9]  Yes    Pneumonia of both lungs due to Mycoplasma pneumoniae [J15.7]  Yes      Resolved Hospital Problems   No resolved problems to display.          Hospital Course  Patient is a 64 y.o. female presented with shortness of breath and cough following recent travel to [place]. She took over-the-counter medicine without relief and was unable to walk very far before becoming short of breath. She denied fever, chills, N/V, chest pain, diarrhea, or syncope. Chest x-ray showed right lower lobe pneumonia. CT chest showed no PE, but confirmed pneumonia. WBC were at 13.12 on admission and are now within normal. Respiratory panel was positive for mycoplasma. She was started on doxycycline and pulmicort treatments. Her shortness of breath has improved while in the hospital. She is given PO doxycycline, albuterol PRN, and promethazine upon discharge.     Procedures Performed         Consults:   Consults       Date and Time Order Name Status Description    9/23/2024 12:27 AM Family Medicine Consult              Pertinent Test Results:    Lab Results (most recent)       Procedure Component Value Units Date/Time    Comprehensive Metabolic Panel [[id number]]  (Abnormal) Collected: 09/26/24 0546    Specimen: Blood Updated: 09/26/24 0615     Glucose 91 mg/dL      BUN 10 mg/dL      Creatinine 0.49 mg/dL      Sodium 142 mmol/L      Potassium 3.9 mmol/L      Comment: Slight hemolysis detected by analyzer. Result may be falsely elevated.        Chloride 104 mmol/L      CO2 29.2 mmol/L      Calcium 8.9 mg/dL      Total Protein 6.1 g/dL      Albumin  3.3 g/dL      ALT (SGPT) 30 U/L      AST (SGOT) 25 U/L      Alkaline Phosphatase 112 U/L      Total Bilirubin 0.2 mg/dL      Globulin 2.8 gm/dL      A/G Ratio 1.2 g/dL      BUN/Creatinine Ratio 20.4     Anion Gap 8.8 mmol/L      eGFR 106.7 mL/min/1.73     Narrative:      GFR Normal >60  Chronic Kidney Disease <60  Kidney Failure <15      CBC & Differential [[id number]]  (Abnormal) Collected: 09/26/24 0444    Specimen: Blood Updated: 09/26/24 0454    Narrative:      The following orders were created for panel order CBC & Differential.  Procedure                               Abnormality         Status                     ---------                               -----------         ------                     CBC Auto Differential[[id number]]        Abnormal            Final result                 Please view results for these tests on the individual orders.    CBC Auto Differential [[id number]]  (Abnormal) Collected: 09/26/24 0444    Specimen: Blood Updated: 09/26/24 0454     WBC 6.62 10*3/mm3      RBC 3.60 10*6/mm3      Hemoglobin 11.2 g/dL      Hematocrit 34.4 %      MCV 95.6 fL      MCH 31.1 pg      MCHC 32.6 g/dL      RDW 13.6 %      RDW-SD 46.4 fl      MPV 9.8 fL      Platelets 280 10*3/mm3      Neutrophil % 51.6 %      Lymphocyte % 34.7 %      Monocyte % 8.9 %      Eosinophil % 3.3 %      Basophil % 0.3 %      Immature Grans % 1.2 %      Neutrophils, Absolute 3.41 10*3/mm3      Lymphocytes, Absolute 2.30 10*3/mm3      Monocytes, Absolute 0.59 10*3/mm3      Eosinophils, Absolute 0.22 10*3/mm3      Basophils, Absolute 0.02 10*3/mm3      Immature Grans, Absolute 0.08 10*3/mm3      nRBC 0.0 /100 WBC     Blood Culture - Blood, Arm, Left [[id number]]  (Normal) Collected: 09/22/24 2124    Specimen: Blood from Arm, Left Updated: 09/25/24 2130     Blood Culture No growth at 3 days    Blood Culture - Blood, Arm, Right [[id number]]  (Normal) Collected: 09/22/24 2105    Specimen: Blood from Arm, Right Updated: 09/25/24 2116      Blood Culture No growth at 3 days    CBC & Differential [[id number]]  (Abnormal) Collected: 09/25/24 0234    Specimen: Blood from Hand, Right Updated: 09/25/24 0320    Narrative:      The following orders were created for panel order CBC & Differential.  Procedure                               Abnormality         Status                     ---------                               -----------         ------                     CBC Auto Differential[[id number]]        Abnormal            Final result               Scan Slide[[id number]]                                       Final result                 Please view results for these tests on the individual orders.    CBC Auto Differential [[id number]]  (Abnormal) Collected: 09/25/24 0234    Specimen: Blood from Hand, Right Updated: 09/25/24 0320     WBC 7.18 10*3/mm3      RBC 3.66 10*6/mm3      Hemoglobin 11.4 g/dL      Hematocrit 34.9 %      MCV 95.4 fL      MCH 31.1 pg      MCHC 32.7 g/dL      RDW 13.7 %      RDW-SD 48.1 fl      MPV 9.1 fL      Platelets 283 10*3/mm3     Narrative:      The previously reported component NRBC is no longer being reported. Previous result was 0.0 /100 WBC (Reference Range: 0.0-0.2 /100 WBC) on 9/25/2024 at [facility] EDT.    Scan Slide [[id number]] Collected: 09/25/24 0234    Specimen: Blood from Hand, Right Updated: 09/25/24 0320     Scan Slide --     Comment: See Manual Differential Results       Manual Differential [[id number]]  (Abnormal) Collected: 09/25/24 0234    Specimen: Blood from Hand, Right Updated: 09/25/24 0320     Neutrophil % 57.0 %      Lymphocyte % 31.0 %      Monocyte % 6.0 %      Eosinophil % 2.0 %      Metamyelocyte % 1.0 %      Atypical Lymphocyte % 3.0 %      Neutrophils Absolute 4.09 10*3/mm3      Lymphocytes Absolute 2.44 10*3/mm3      Monocytes Absolute 0.43 10*3/mm3      Eosinophils Absolute 0.14 10*3/mm3      RBC Morphology Normal     WBC Morphology Normal     Platelet Estimate Adequate    Comprehensive Metabolic Panel  [[id number]]  (Abnormal) Collected: 09/25/24 0234    Specimen: Blood from Hand, Right Updated: 09/25/24 0302     Glucose 95 mg/dL      BUN 9 mg/dL      Creatinine 0.60 mg/dL      Sodium 142 mmol/L      Potassium 4.0 mmol/L      Chloride 105 mmol/L      CO2 30.0 mmol/L      Calcium 9.1 mg/dL      Total Protein 6.0 g/dL      Albumin 3.2 g/dL      ALT (SGPT) 39 U/L      AST (SGOT) 27 U/L      Alkaline Phosphatase 125 U/L      Total Bilirubin 0.3 mg/dL      Globulin 2.8 gm/dL      A/G Ratio 1.1 g/dL      BUN/Creatinine Ratio 15.0     Anion Gap 7.0 mmol/L      eGFR 101.6 mL/min/1.73     Narrative:      GFR Normal >60  Chronic Kidney Disease <60  Kidney Failure <15      Basic Metabolic Panel [[id number]]  (Abnormal) Collected: 09/24/24 0043    Specimen: Blood from Arm, Left Updated: 09/24/24 0133     Glucose 111 mg/dL      BUN 6 mg/dL      Creatinine 0.33 mg/dL      Sodium 142 mmol/L      Potassium 4.1 mmol/L      Chloride 104 mmol/L      CO2 29.1 mmol/L      Calcium 8.9 mg/dL      BUN/Creatinine Ratio 18.2     Anion Gap 8.9 mmol/L      eGFR 117.4 mL/min/1.73     Narrative:      GFR Normal >60  Chronic Kidney Disease <60  Kidney Failure <15      Basic Metabolic Panel [[id number]]  (Abnormal) Collected: 09/23/24 0319    Specimen: Blood from Arm, Left Updated: 09/23/24 0356     Glucose 105 mg/dL      BUN 7 mg/dL      Creatinine 0.64 mg/dL      Sodium 139 mmol/L      Potassium 3.9 mmol/L      Chloride 101 mmol/L      CO2 27.5 mmol/L      Calcium 9.0 mg/dL      BUN/Creatinine Ratio 10.9     Anion Gap 10.5 mmol/L      eGFR 100.1 mL/min/1.73     Narrative:      GFR Normal >60  Chronic Kidney Disease <60  Kidney Failure <15      High Sensitivity Troponin T 2Hr [[id number]] Collected: 09/22/24 2326    Specimen: Blood from Arm, Left Updated: 09/23/24 0003     HS Troponin T <6 ng/L      Troponin T Delta --     Comment: Unable to calculate.       Narrative:      High Sensitive Troponin T Reference Range:  <14.0 ng/L- Negative Female for  AMI  <22.0 ng/L- Negative Male for AMI  >=14 - Abnormal Female indicating possible myocardial injury.  >=22 - Abnormal Male indicating possible myocardial injury.   Clinicians would have to utilize clinical acumen, EKG, Troponin, and serial changes to determine if it is an Acute Myocardial Infarction or myocardial injury due to an underlying chronic condition.         Respiratory Panel PCR w/COVID-19(SARS-CoV-2) [facility]IEL/[facility] In-House, NP Swab in UTM/VTM, 2 HR TAT - Swab, Nasopharynx [[id number]]  (Abnormal) Collected: 09/22/24 2106    Specimen: Swab from Nasopharynx Updated: 09/22/24 2218     ADENOVIRUS, PCR Not Detected     Coronavirus 229E Not Detected     Coronavirus HKU1 Not Detected     Coronavirus NL63 Not Detected     Coronavirus OC43 Not Detected     COVID19 Not Detected     Human Metapneumovirus Not Detected     Human Rhinovirus/Enterovirus Not Detected     Influenza A PCR Not Detected     Influenza B PCR Not Detected     Parainfluenza Virus 1 Not Detected     Parainfluenza Virus 2 Not Detected     Parainfluenza Virus 3 Not Detected     Parainfluenza Virus 4 Not Detected     RSV, PCR Not Detected     Bordetella pertussis pcr Not Detected     Bordetella parapertussis PCR Not Detected     Chlamydophila pneumoniae PCR Not Detected     Mycoplasma pneumo by PCR Detected    Narrative:      In the setting of a positive respiratory panel with a viral infection PLUS a negative procalcitonin without other underlying concern for bacterial infection, consider observing off antibiotics or discontinuation of antibiotics and continue supportive care. If the respiratory panel is positive for atypical bacterial infection (Bordetella pertussis, Chlamydophila pneumoniae, or Mycoplasma pneumoniae), consider antibiotic de-escalation to target atypical bacterial infection.    High Sensitivity Troponin T [[id number]]  (Normal) Collected: 09/22/24 2105    Specimen: Blood from Arm, Right Updated: 09/22/24 2145     HS  Troponin T <6 ng/L     Narrative:      High Sensitive Troponin T Reference Range:  <14.0 ng/L- Negative Female for AMI  <22.0 ng/L- Negative Male for AMI  >=14 - Abnormal Female indicating possible myocardial injury.  >=22 - Abnormal Male indicating possible myocardial injury.   Clinicians would have to utilize clinical acumen, EKG, Troponin, and serial changes to determine if it is an Acute Myocardial Infarction or myocardial injury due to an underlying chronic condition.         BNP [[id number]]  (Normal) Collected: 09/22/24 2105    Specimen: Blood from Arm, Right Updated: 09/22/24 2145     proBNP 57.6 pg/mL     Narrative:      This assay is used as an aid in the diagnosis of individuals suspected of having heart failure. It can be used as an aid in the diagnosis of acute decompensated heart failure (ADHF) in patients presenting with signs and symptoms of ADHF to the emergency department (ED). In addition, NT-proBNP of <300 pg/mL indicates ADHF is not likely.    Age Range Result Interpretation  NT-proBNP Concentration (pg/mL:      <50             Positive            >450                   Gray                 300-450                    Negative             <300    50-75           Positive            >900                  Gray                300-900                  Negative            <300      >75             Positive            >1800                  Gray                300-1800                  Negative            <300    POC Lactate [[id number]]  (Normal) Collected: 09/22/24 2113    Specimen: Blood Updated: 09/22/24 2115     Lactate 0.5 mmol/L      Comment: Serial Number: [id number]:  [id number]                            Condition on Discharge:  Stable    Vital Signs  Temp:  [97.8 °F (36.6 °C)-98.3 °F (36.8 °C)] 97.8 °F (36.6 °C)  Heart Rate:  [75-90] 76  Resp:  [20-29] 23  BP: (124-137)/(60-79) 129/79    Physical Exam:     General Appearance:    Alert, cooperative, in no acute distress   Head:     Normocephalic, without obvious abnormality, atraumatic   Eyes:            Lids and lashes normal, conjunctivae and sclerae normal, no   icterus, no pallor, corneas clear, PERRLA   Ears:    Ears appear intact with no abnormalities noted   Throat:   No oral lesions, no thrush, oral mucosa moist   Neck:   No adenopathy, supple, trachea midline, no thyromegaly, no   carotid bruit, no JVD   Lungs:     Clear to auscultation,respirations regular, even and                  unlabored    Heart:    Regular rhythm and normal rate, normal S1 and S2, no            murmur, no gallop, no rub, no click   Chest Wall:    No abnormalities observed   Abdomen:     Normal bowel sounds, no masses, no organomegaly, soft        non-tender, non-distended, no guarding, no rebound                tenderness   Extremities:   Moves all extremities well, no edema, no cyanosis, no             redness   Pulses:   Pulses palpable and equal bilaterally   Skin:   No bleeding, bruising or rash   Lymph nodes:   No palpable adenopathy   Neurologic:   Cranial nerves 2 - 12 grossly intact, sensation intact, DTR       present and equal bilaterally       Discharge Disposition  Home or Self Care    Discharge Medications     Discharge Medications        New Medications        Instructions Start Date   acetaminophen 325 MG tablet  Commonly known as: TYLENOL   650 mg, Oral, Every 6 Hours PRN      albuterol sulfate  (90 Base) MCG/ACT inhaler  Commonly known as: PROVENTIL HFA;VENTOLIN HFA;PROAIR HFA   2 puffs, Inhalation, Every 4 Hours PRN      doxycycline 100 MG capsule  Commonly known as: VIBRAMYCIN   100 mg, Oral, 2 Times Daily      guaifenesin 100 MG/5ML liquid  Commonly known as: ROBITUSSIN   200 mg, Oral, Every 4 Hours PRN      promethazine 25 MG tablet  Commonly known as: PHENERGAN   25 mg, Oral, Every 6 Hours PRN             Continue These Medications        Instructions Start Date   diclofenac sodium 100 MG 24 hr tablet  Commonly known as: DALLIN  XR   100 mg, Oral, Daily PRN               Discharge Diet: Regular diet    Activity at Discharge: As tolerated by patient    Follow-up Appointments  No future appointments.  Additional Instructions for the Follow-ups that You Need to Schedule       Discharge Follow-up with PCP   As directed       Currently Documented PCP:    Loida Valente MD    PCP Phone Number:    [phone]     Follow Up Details: Call office to schedule a hospital follow up appointment.                Test Results Pending at Discharge  Pending Labs       Order Current Status    Blood Culture - Blood, Arm, Left Preliminary result    Blood Culture - Blood, Arm, Right Preliminary result             KATINA Brice Student  09/26/24  12:04 EDT    Time: Discharge 25 min    I have interviewed and examined patient and provided 100% of the medical decision making.  Tash Dias MD

## 2024-09-26 NOTE — PAYOR COMM NOTE
"Clinical update for case# [id number]     Patient remains on supplemental oxygen and IV antibiotics. Updated MD notes and clinical attached.     ==============  AUTHORIZATION PENDING FOR 9/25;       PLEASE FAX OR CALL DETERMINATION TO CONTACT BELOW:       THANK YOU,    ANKUSH Gutierrez, RN  Utilization Review  [facility]  Phone: [phone]  Fax: [phone]      NPI: [id number]  Tax ID: [id number]        Amari Garcia (64 y.o. Female)       Date of Birth   1960    Social Security Number       Address   [address] [facility] [place] [address]    Home Phone   [phone]    MRN   [id number]       [facility]    Marital Status   Single                            Admission Date   9/22/24    Admission Type   Emergency    Admitting Provider   Tash Dias MD    Attending Provider   Tash Dias MD    Department, Room/Bed   [facility] PROGRESS CARE, 2129/1       Discharge Date       Discharge Disposition       Discharge Destination                                 Attending Provider: Tash Dias MD    Allergies: Penicillins, Morphine    Isolation: Droplet   Infection: Mycoplasma pneumonia (09/22/24)   Code Status: CPR    Ht: 162.6 cm (64\")   Wt: 118 kg (259 lb 14.8 oz)    Admission Cmt: None   Principal Problem: Pneumonia of right lower lobe due to infectious organism [J18.9]                   Active Insurance as of 9/22/2024       Primary Coverage       Payor Plan Insurance Group Employer/Plan Group    [employer] [id number]       Payor Plan Address Payor Plan Phone Number Payor Plan Fax Number Effective Dates    [address] [phone]  1/1/2020 - None Entered    [facility] [address]         Subscriber Name Subscriber Birth Date Member ID       AMARI GARCIA 1960 [id number]                     Emergency Contacts        (Rel.) Home Phone Work Phone Mobile Phone    JOSIE GARSIA (Brother) -- -- [phone]              Vital Signs " (last day)       Date/Time Temp Temp src Pulse Resp BP Patient Position SpO2    09/26/24 0845 97.8 (36.6) Oral 76 23 129/79 -- 93    09/26/24 0719 -- -- 75 20 -- -- 97    09/26/24 0716 -- -- 76 20 -- -- 94    09/26/24 0439 98.1 (36.7) Oral 79 21 124/70 Lying 96    09/26/24 0106 98.3 (36.8) Oral 80 22 134/75 Lying 90    09/25/24 2126 98.1 (36.7) Oral 85 29 125/61 Lying 91    09/25/24 1932 -- -- 82 20 -- -- 97    09/25/24 1929 -- -- 90 22 -- -- 93    09/25/24 1411 98.2 (36.8) Oral 81 24 137/60 -- 94    09/25/24 0807 -- -- 70 20 -- -- 97    09/25/24 0804 -- -- 75 19 -- -- 95    09/25/24 0743 97.8 (36.6) Oral 75 20 127/63 -- 93    09/25/24 0502 98.1 (36.7) Oral 75 22 126/65 Lying 97    09/25/24 0149 98.1 (36.7) Oral 73 25 126/73 Lying 95          Oxygen Therapy (last day)       Date/Time SpO2 Device (Oxygen Therapy) Flow (L/min) Oxygen Concentration (%) ETCO2 (mmHg)    09/26/24 0845 93 nasal cannula 2 -- --    09/26/24 0800 -- nasal cannula 2 -- --    09/26/24 0719 97 nasal cannula 2 -- --    09/26/24 0716 94 nasal cannula 2 -- --    09/26/24 0439 96 room air -- -- --    09/26/24 0415 -- room air -- -- --    09/26/24 0111 -- room air -- -- --    09/26/24 0106 90 room air -- -- --    09/25/24 2126 91 room air -- -- --    09/25/24 1932 97 room air -- -- --    09/25/24 1929 93 room air -- -- --    09/25/24 1646 -- nasal cannula 2 -- --    09/25/24 1415 -- room air -- -- --    09/25/24 1411 94 -- -- -- --    09/25/24 1235 -- nasal cannula 2 -- --    09/25/24 0830 -- nasal cannula 2 -- --    09/25/24 0807 97 nasal cannula 2 -- --    09/25/24 0804 95 nasal cannula 2 -- --    09/25/24 0743 93 nasal cannula 2 -- --    09/25/24 0502 97 nasal cannula 2 -- --    09/25/24 0149 95 nasal cannula 2 -- --          Current Facility-Administered Medications   Medication Dose Route Frequency Provider Last Rate Last Admin    acetaminophen (TYLENOL) tablet 650 mg  650 mg Oral Q6H PRN Awilda Doty APRN   650 mg at 09/26/24 0552     sennosides-docusate (PERICOLACE) 8.6-50 MG per tablet 2 tablet  2 tablet Oral BID PRN Bhavya Mathias APRN        And    polyethylene glycol (MIRALAX) packet 17 g  17 g Oral Daily PRN Bhavya Mathias APRN        And    bisacodyl (DULCOLAX) EC tablet 5 mg  5 mg Oral Daily PRN Bhavya Mathias APRN        And    bisacodyl (DULCOLAX) suppository 10 mg  10 mg Rectal Daily PRN Bhavya Mathias APRN        budesonide (PULMICORT) nebulizer solution 0.5 mg  0.5 mg Nebulization BID - RT Tash Dias MD   0.5 mg at 09/26/24 0716    doxycycline (VIBRAMYCIN) 100 mg in sodium chloride 0.9 % 100 mL MBP  100 mg Intravenous Q12H Bhavya Mathias APRN   100 mg at 09/26/24 0904    Enoxaparin Sodium (LOVENOX) syringe 40 mg  40 mg Subcutaneous BID Tash Dias MD   40 mg at 09/26/24 0904    guaifenesin (ROBITUSSIN) 100 MG/5ML liquid 200 mg  200 mg Oral Q4H PRN Bhavya Mathias APRN   200 mg at 09/25/24 2226    ipratropium-albuterol (DUO-NEB) nebulizer solution 3 mL  3 mL Nebulization Q4H PRN Bhavya Mathias APRN   3 mL at 09/24/24 0650    melatonin tablet 5 mg  5 mg Oral Nightly PRN Bhavya Mathias APRN   5 mg at 09/24/24 2200    nitroglycerin (NITROSTAT) SL tablet 0.4 mg  0.4 mg Sublingual Q5 Min PRN Bhavya Mathias APRN        ondansetron (ZOFRAN) injection 4 mg  4 mg Intravenous Q6H PRN Bhavya Mathias APRN        promethazine (PHENERGAN) tablet 25 mg  25 mg Oral Q6H PRN Tash Dias MD   25 mg at 09/25/24 2226    sodium chloride 0.9 % flush 10 mL  10 mL Intravenous PRN Travis Olmstead MD        sodium chloride 0.9 % flush 10 mL  10 mL Intravenous Q12H Bhavya Mathias APRN   10 mL at 09/26/24 0904    sodium chloride 0.9 % flush 10 mL  10 mL Intravenous PRN Bhavya Mathias APRN        sodium chloride 0.9 % infusion 40 mL  40 mL Intravenous PRN Bhavya Mathias APRN         Lab Results (last 24 hours)       Procedure Component Value Units Date/Time    Comprehensive Metabolic Panel [[id number]]  (Abnormal) Collected: 09/26/24  0546    Specimen: Blood Updated: 09/26/24 0615     Glucose 91 mg/dL      BUN 10 mg/dL      Creatinine 0.49 mg/dL      Sodium 142 mmol/L      Potassium 3.9 mmol/L      Comment: Slight hemolysis detected by analyzer. Result may be falsely elevated.        Chloride 104 mmol/L      CO2 29.2 mmol/L      Calcium 8.9 mg/dL      Total Protein 6.1 g/dL      Albumin 3.3 g/dL      ALT (SGPT) 30 U/L      AST (SGOT) 25 U/L      Alkaline Phosphatase 112 U/L      Total Bilirubin 0.2 mg/dL      Globulin 2.8 gm/dL      A/G Ratio 1.2 g/dL      BUN/Creatinine Ratio 20.4     Anion Gap 8.8 mmol/L      eGFR 106.7 mL/min/1.73     Narrative:      GFR Normal >60  Chronic Kidney Disease <60  Kidney Failure <15      CBC & Differential [[id number]]  (Abnormal) Collected: 09/26/24 0444    Specimen: Blood Updated: 09/26/24 0454    Narrative:      The following orders were created for panel order CBC & Differential.  Procedure                               Abnormality         Status                     ---------                               -----------         ------                     CBC Auto Differential[[id number]]        Abnormal            Final result                 Please view results for these tests on the individual orders.    CBC Auto Differential [[id number]]  (Abnormal) Collected: 09/26/24 0444    Specimen: Blood Updated: 09/26/24 0454     WBC 6.62 10*3/mm3      RBC 3.60 10*6/mm3      Hemoglobin 11.2 g/dL      Hematocrit 34.4 %      MCV 95.6 fL      MCH 31.1 pg      MCHC 32.6 g/dL      RDW 13.6 %      RDW-SD 46.4 fl      MPV 9.8 fL      Platelets 280 10*3/mm3      Neutrophil % 51.6 %      Lymphocyte % 34.7 %      Monocyte % 8.9 %      Eosinophil % 3.3 %      Basophil % 0.3 %      Immature Grans % 1.2 %      Neutrophils, Absolute 3.41 10*3/mm3      Lymphocytes, Absolute 2.30 10*3/mm3      Monocytes, Absolute 0.59 10*3/mm3      Eosinophils, Absolute 0.22 10*3/mm3      Basophils, Absolute 0.02 10*3/mm3      Immature Grans, Absolute 0.08  10*3/mm3      nRBC 0.0 /100 WBC     Blood Culture - Blood, Arm, Left [[id number]]  (Normal) Collected: 09/22/24 2124    Specimen: Blood from Arm, Left Updated: 09/25/24 2130     Blood Culture No growth at 3 days    Blood Culture - Blood, Arm, Right [[id number]]  (Normal) Collected: 09/22/24 2105    Specimen: Blood from Arm, Right Updated: 09/25/24 2116     Blood Culture No growth at 3 days          Imaging Results (Last 48 Hours)       ** No results found for the last 48 hours. **             Physician Progress Notes (last 48 hours)        Johnna Cifuentes APRN at 09/25/24 0931       Attestation signed by Tash Dias MD at 09/25/24 2009    I have reviewed this documentation and agree.                       LOS: 2 days   Patient Care Team:  Loida Valente MD as PCP - General (Family Medicine)  Cielo Birmingham MD as Consulting Physician (Interventional Cardiology)    Subjective     Interval History:    Patient Complaints: Continues to require O2.  Starting to feel somewhat better but continues with exertional shortness of breath and wheeze    History taken from: patient    Review of Systems   Constitutional:  Positive for fatigue.   HENT:  Negative for trouble swallowing.    Respiratory:  Positive for cough, chest tightness, shortness of breath and wheezing.    Cardiovascular:  Negative for chest pain and leg swelling.   Gastrointestinal:  Negative for diarrhea and nausea.   Genitourinary:  Negative for difficulty urinating.   Musculoskeletal:  Negative for gait problem.   Skin:  Negative for rash.   Neurological:  Negative for weakness.   Psychiatric/Behavioral:  Negative for confusion.            Objective     Vital Signs  Temp:  [97.4 °F (36.3 °C)-98.1 °F (36.7 °C)] 97.8 °F (36.6 °C)  Heart Rate:  [70-84] 70  Resp:  [18-25] 20  BP: (109-127)/(50-73) 127/63    Physical Exam:     General Appearance:    Alert, cooperative, in no acute distress,   Head:    Normocephalic, without obvious abnormality, atraumatic    Eyes:            Lids and lashes normal, conjunctivae and sclerae normal, no   icterus, no pallor   Ears:    Ears appear intact with no abnormalities noted   Throat:   No oral lesions, no thrush, oral mucosa moist   Neck:   No adenopathy, supple, trachea midline, no thyromegaly, no   carotid bruit, no JVD   Lungs:    Mild wheezing and rhonchi present bilaterally, respirations regular, even and  unlabored    Heart:    Regular rhythm and normal rate, normal S1 and S2, no            murmur, no gallop, no rub, no click   Chest Wall:    No abnormalities observed   Abdomen:     Normal bowel sounds, no masses, no organomegaly, soft        Non-tender non-distended, no guarding,   Extremities:   Moves all extremities well, no edema, no cyanosis, no             Redness   Pulses:   Pulses palpable and equal bilaterally   Skin:   No bleeding, bruising or rash                Results Review:    Lab Results (last 24 hours)       Procedure Component Value Units Date/Time    CBC & Differential [[id number]]  (Abnormal) Collected: 09/25/24 0234    Specimen: Blood from Hand, Right Updated: 09/25/24 0320    Narrative:      The following orders were created for panel order CBC & Differential.  Procedure                               Abnormality         Status                     ---------                               -----------         ------                     CBC Auto Differential[[id number]]        Abnormal            Final result               Scan Slide[[id number]]                                       Final result                 Please view results for these tests on the individual orders.    CBC Auto Differential [[id number]]  (Abnormal) Collected: 09/25/24 0234    Specimen: Blood from Hand, Right Updated: 09/25/24 0320     WBC 7.18 10*3/mm3      RBC 3.66 10*6/mm3      Hemoglobin 11.4 g/dL      Hematocrit 34.9 %      MCV 95.4 fL      MCH 31.1 pg      MCHC 32.7 g/dL      RDW 13.7 %      RDW-SD 48.1 fl      MPV 9.1 fL       Platelets 283 10*3/mm3     Narrative:      The previously reported component NRBC is no longer being reported. Previous result was 0.0 /100 WBC (Reference Range: 0.0-0.2 /100 WBC) on 9/25/2024 at 0243 EDT.    Scan Slide [[id number]] Collected: 09/25/24 0234    Specimen: Blood from Hand, Right Updated: 09/25/24 0320     Scan Slide --     Comment: See Manual Differential Results       Manual Differential [[id number]]  (Abnormal) Collected: 09/25/24 0234    Specimen: Blood from Hand, Right Updated: 09/25/24 0320     Neutrophil % 57.0 %      Lymphocyte % 31.0 %      Monocyte % 6.0 %      Eosinophil % 2.0 %      Metamyelocyte % 1.0 %      Atypical Lymphocyte % 3.0 %      Neutrophils Absolute 4.09 10*3/mm3      Lymphocytes Absolute 2.44 10*3/mm3      Monocytes Absolute 0.43 10*3/mm3      Eosinophils Absolute 0.14 10*3/mm3      RBC Morphology Normal     WBC Morphology Normal     Platelet Estimate Adequate    Comprehensive Metabolic Panel [[id number]]  (Abnormal) Collected: 09/25/24 0234    Specimen: Blood from Hand, Right Updated: 09/25/24 0302     Glucose 95 mg/dL      BUN 9 mg/dL      Creatinine 0.60 mg/dL      Sodium 142 mmol/L      Potassium 4.0 mmol/L      Chloride 105 mmol/L      CO2 30.0 mmol/L      Calcium 9.1 mg/dL      Total Protein 6.0 g/dL      Albumin 3.2 g/dL      ALT (SGPT) 39 U/L      AST (SGOT) 27 U/L      Alkaline Phosphatase 125 U/L      Total Bilirubin 0.3 mg/dL      Globulin 2.8 gm/dL      A/G Ratio 1.1 g/dL      BUN/Creatinine Ratio 15.0     Anion Gap 7.0 mmol/L      eGFR 101.6 mL/min/1.73     Narrative:      GFR Normal >60  Chronic Kidney Disease <60  Kidney Failure <15      Blood Culture - Blood, Arm, Left [[id number]]  (Normal) Collected: 09/22/24 2124    Specimen: Blood from Arm, Left Updated: 09/24/24 2131     Blood Culture No growth at 2 days    Blood Culture - Blood, Arm, Right [[id number]]  (Normal) Collected: 09/22/24 2105    Specimen: Blood from Arm, Right Updated: 09/24/24 2115     Blood Culture  No growth at 2 days             Imaging Results (Last 24 Hours)       ** No results found for the last 24 hours. **                 I reviewed the patient's new clinical results.    Medication Review:   Scheduled Meds:budesonide, 0.5 mg, Nebulization, BID - RT  doxycycline, 100 mg, Intravenous, Q12H  enoxaparin, 40 mg, Subcutaneous, BID  sodium chloride, 10 mL, Intravenous, Q12H      Continuous Infusions:   PRN Meds:.  acetaminophen    senna-docusate sodium **AND** polyethylene glycol **AND** bisacodyl **AND** bisacodyl    guaifenesin    ipratropium-albuterol    melatonin    nitroglycerin    ondansetron    promethazine    [COMPLETED] Insert Peripheral IV **AND** sodium chloride    sodium chloride    sodium chloride     Assessment & Plan       Acute respiratory distress  Bronchospasm  Pneumonia of both lungs due to Mycoplasma pneumoniae  -Confirmed with CXR and CT chest, respiratory panel positive for M. Pneumoniae  -WBC improving  -Continue doxycycline, pulmicort nebulizer treatments  -Continue oxygen, will wean to 2L and continue if tolerating    DVT Prophylaxis - Lovenox    Plan for disposition:Home    Johnna CifuentesKATINA  09/25/24  09:31 EDT          Electronically signed by Tash Dias MD at 09/25/24 2009       Cristy Carreno PA-C at 09/24/24 1128       Attestation signed by Tash Dias MD at 09/24/24 4196    I have reviewed this documentation and agree.                       LOS: 1 day   Patient Care Team:  Loida Valente MD as PCP - General (Family Medicine)  Cielo Birmingham MD as Consulting Physician (Interventional Cardiology)    Subjective     Interval History:    Patient Complaints: Continues to require O2. Reports increased SOB with exertion. Cough productive. Overall she feels improved.    History taken from: patient    Review of Systems   Constitutional:  Positive for fatigue.   HENT:  Negative for trouble swallowing.    Respiratory:  Positive for cough, chest tightness, shortness of breath and  wheezing.    Cardiovascular:  Negative for chest pain and leg swelling.   Gastrointestinal:  Negative for diarrhea and nausea.   Genitourinary:  Negative for difficulty urinating.   Musculoskeletal:  Negative for gait problem.   Skin:  Negative for rash.   Neurological:  Negative for weakness.   Psychiatric/Behavioral:  Negative for confusion.            Objective     Vital Signs  Temp:  [97.6 °F (36.4 °C)-98.1 °F (36.7 °C)] 97.6 °F (36.4 °C)  Heart Rate:  [69-86] 84  Resp:  [15-25] 24  BP: (109-140)/(49-79) 109/50    Physical Exam:     General Appearance:    Alert, cooperative, in no acute distress,   Head:    Normocephalic, without obvious abnormality, atraumatic   Eyes:            Lids and lashes normal, conjunctivae and sclerae normal, no   icterus, no pallor   Ears:    Ears appear intact with no abnormalities noted   Throat:   No oral lesions, no thrush, oral mucosa moist   Neck:   No adenopathy, supple, trachea midline, no thyromegaly, no   carotid bruit, no JVD   Lungs:    Mild wheezing and rhonchi present bilaterally, respirations regular, even and  unlabored    Heart:    Regular rhythm and normal rate, normal S1 and S2, no            murmur, no gallop, no rub, no click   Chest Wall:    No abnormalities observed   Abdomen:     Normal bowel sounds, no masses, no organomegaly, soft        Non-tender non-distended, no guarding,   Extremities:   Moves all extremities well, no edema, no cyanosis, no             Redness   Pulses:   Pulses palpable and equal bilaterally   Skin:   No bleeding, bruising or rash                Results Review:    Lab Results (last 24 hours)       Procedure Component Value Units Date/Time    Basic Metabolic Panel [[id number]]  (Abnormal) Collected: 09/24/24 0043    Specimen: Blood from Arm, Left Updated: 09/24/24 0133     Glucose 111 mg/dL      BUN 6 mg/dL      Creatinine 0.33 mg/dL      Sodium 142 mmol/L      Potassium 4.1 mmol/L      Chloride 104 mmol/L      CO2 29.1 mmol/L       Calcium 8.9 mg/dL      BUN/Creatinine Ratio 18.2     Anion Gap 8.9 mmol/L      eGFR 117.4 mL/min/1.73     Narrative:      GFR Normal >60  Chronic Kidney Disease <60  Kidney Failure <15      CBC & Differential [[id number]]  (Abnormal) Collected: 09/24/24 0043    Specimen: Blood from Arm, Left Updated: 09/24/24 0101    Narrative:      The following orders were created for panel order CBC & Differential.  Procedure                               Abnormality         Status                     ---------                               -----------         ------                     CBC Auto Differential[[id number]]        Abnormal            Final result                 Please view results for these tests on the individual orders.    CBC Auto Differential [[id number]]  (Abnormal) Collected: 09/24/24 0043    Specimen: Blood from Arm, Left Updated: 09/24/24 0101     WBC 9.47 10*3/mm3      RBC 3.75 10*6/mm3      Hemoglobin 11.3 g/dL      Hematocrit 35.5 %      MCV 94.7 fL      MCH 30.1 pg      MCHC 31.8 g/dL      RDW 13.9 %      RDW-SD 48.3 fl      MPV 9.4 fL      Platelets 267 10*3/mm3      Neutrophil % 69.5 %      Lymphocyte % 18.2 %      Monocyte % 9.8 %      Eosinophil % 1.9 %      Basophil % 0.2 %      Immature Grans % 0.4 %      Neutrophils, Absolute 6.58 10*3/mm3      Lymphocytes, Absolute 1.72 10*3/mm3      Monocytes, Absolute 0.93 10*3/mm3      Eosinophils, Absolute 0.18 10*3/mm3      Basophils, Absolute 0.02 10*3/mm3      Immature Grans, Absolute 0.04 10*3/mm3      nRBC 0.0 /100 WBC     Blood Culture - Blood, Arm, Left [[id number]]  (Normal) Collected: 09/22/24 2124    Specimen: Blood from Arm, Left Updated: 09/23/24 2131     Blood Culture No growth at 24 hours    Blood Culture - Blood, Arm, Right [[id number]]  (Normal) Collected: 09/22/24 2105    Specimen: Blood from Arm, Right Updated: 09/23/24 2115     Blood Culture No growth at 24 hours             Imaging Results (Last 24 Hours)       ** No results found for the last  24 hours. **                 I reviewed the patient's new clinical results.    Medication Review:   Scheduled Meds:budesonide, 0.5 mg, Nebulization, BID - RT  doxycycline, 100 mg, Intravenous, Q12H  enoxaparin, 40 mg, Subcutaneous, BID  sodium chloride, 10 mL, Intravenous, Q12H      Continuous Infusions:   PRN Meds:.  acetaminophen    senna-docusate sodium **AND** polyethylene glycol **AND** bisacodyl **AND** bisacodyl    guaifenesin    ipratropium-albuterol    melatonin    nitroglycerin    ondansetron    promethazine    [COMPLETED] Insert Peripheral IV **AND** sodium chloride    sodium chloride    sodium chloride     Assessment & Plan     Acute respiratory distress    Bronchospasm    Pneumonia of both lungs due to Mycoplasma pneumoniae  -Confirmed with CXR and CT chest, respiratory panel positive for M. Pneumoniae  -WBC improving  -Continue doxycycline, pulmicort nebulizer treatments  -Continue oxygen, will wean to 2L and continue if tolerating    DVT Prophylaxis - Lovenox    Plan for disposition:Home    Cristy Carreno PA-C  09/24/24  11:28 EDT          Electronically signed by Tash Dias MD at 09/24/24 6805

## 2024-09-26 NOTE — PLAN OF CARE
Goal Outcome Evaluation:              Problem: Adult Inpatient Plan of Care  Goal: Plan of Care Review  9/26/2024 0305 by Nanci Gamboa RN  Outcome: Progressing  9/25/2024 1743 by Nanci Gamboa RN  Outcome: Progressing  Goal: Patient-Specific Goal (Individualized)  9/26/2024 0305 by Nanci Gamboa RN  Outcome: Progressing  9/25/2024 1743 by Nanci Gamboa RN  Outcome: Progressing  Goal: Absence of Hospital-Acquired Illness or Injury  9/26/2024 0305 by Nanci Gamboa RN  Outcome: Progressing  9/25/2024 1743 by Nanci Gamboa RN  Outcome: Progressing  Intervention: Identify and Manage Fall Risk  Recent Flowsheet Documentation  Taken 9/26/2024 0228 by Nanci Gamboa RN  Safety Promotion/Fall Prevention:   activity supervised   assistive device/personal items within reach   clutter free environment maintained   fall prevention program maintained   nonskid shoes/slippers when out of bed   room organization consistent   safety round/check completed  Taken 9/26/2024 0111 by Nanci Gamboa RN  Safety Promotion/Fall Prevention:   activity supervised   assistive device/personal items within reach   clutter free environment maintained   fall prevention program maintained   nonskid shoes/slippers when out of bed   room organization consistent   safety round/check completed  Taken 9/25/2024 1807 by Nanci Gamboa RN  Safety Promotion/Fall Prevention:   activity supervised   clutter free environment maintained   assistive device/personal items within reach   fall prevention program maintained   nonskid shoes/slippers when out of bed   room organization consistent   safety round/check completed  Taken 9/25/2024 1646 by Nanci Gamboa RN  Safety Promotion/Fall Prevention:   activity supervised   clutter free environment maintained   assistive device/personal items within reach   fall prevention program maintained   nonskid shoes/slippers when out of bed   room organization consistent   safety round/check  completed  Taken 9/25/2024 1415 by Nanci Gamboa RN  Safety Promotion/Fall Prevention:   activity supervised   assistive device/personal items within reach   clutter free environment maintained   fall prevention program maintained   nonskid shoes/slippers when out of bed   room organization consistent   safety round/check completed  Intervention: Prevent Skin Injury  Recent Flowsheet Documentation  Taken 9/26/2024 0111 by Nanci Gamboa RN  Body Position: position changed independently  Skin Protection: adhesive use limited  Taken 9/25/2024 1807 by Nanci Gamboa RN  Body Position: position changed independently  Taken 9/25/2024 1646 by Nanci Gamboa RN  Body Position: position changed independently  Skin Protection: adhesive use limited  Intervention: Prevent and Manage VTE (Venous Thromboembolism) Risk  Recent Flowsheet Documentation  Taken 9/26/2024 0111 by Nanci Gamboa RN  Activity Management: activity encouraged  VTE Prevention/Management: patient refused intervention  Range of Motion: active ROM (range of motion) encouraged  Taken 9/25/2024 1807 by Nanci Gamboa RN  Activity Management: activity encouraged  Taken 9/25/2024 1646 by Nanci Gamboa RN  Activity Management: activity encouraged  VTE Prevention/Management: patient refused intervention  Range of Motion: active ROM (range of motion) encouraged  Intervention: Prevent Infection  Recent Flowsheet Documentation  Taken 9/26/2024 0228 by Nanci Gamboa RN  Infection Prevention:   hand hygiene promoted   personal protective equipment utilized   rest/sleep promoted   single patient room provided   environmental surveillance performed  Taken 9/26/2024 0111 by Nanci Gamboa RN  Infection Prevention:   hand hygiene promoted   personal protective equipment utilized   rest/sleep promoted   single patient room provided   environmental surveillance performed  Taken 9/25/2024 1807 by Nanci Gamboa RN  Infection Prevention:   hand hygiene  promoted   personal protective equipment utilized   rest/sleep promoted   single patient room provided   environmental surveillance performed  Taken 9/25/2024 1646 by Nanci Gamboa RN  Infection Prevention:   hand hygiene promoted   personal protective equipment utilized   rest/sleep promoted   single patient room provided   environmental surveillance performed  Taken 9/25/2024 1415 by Nanci Gamboa RN  Infection Prevention:   hand hygiene promoted   personal protective equipment utilized   rest/sleep promoted   single patient room provided   environmental surveillance performed  Goal: Optimal Comfort and Wellbeing  9/26/2024 0305 by Nanci Gamboa RN  Outcome: Progressing  9/25/2024 1743 by Nanci Gamboa RN  Outcome: Progressing  Intervention: Provide Person-Centered Care  Recent Flowsheet Documentation  Taken 9/26/2024 0111 by Nanci Gamboa RN  Trust Relationship/Rapport:   care explained   choices provided  Taken 9/25/2024 1646 by Nanci Gamboa RN  Trust Relationship/Rapport:   care explained   choices provided  Goal: Readiness for Transition of Care  9/26/2024 0305 by Nanci Gamboa RN  Outcome: Progressing  9/25/2024 1743 by Nanci Gamboa RN  Outcome: Progressing     Problem: Infection  Goal: Absence of Infection Signs and Symptoms  9/26/2024 0305 by Nanci Gamboa RN  Outcome: Progressing  9/25/2024 1743 by Nanci Gamboa RN  Outcome: Progressing  Intervention: Prevent or Manage Infection  Recent Flowsheet Documentation  Taken 9/26/2024 0228 by Nanci Gamboa RN  Isolation Precautions: precautions maintained  Taken 9/26/2024 0111 by Nanci Gamboa RN  Isolation Precautions: precautions maintained  Taken 9/25/2024 1807 by Nanci Gamboa RN  Isolation Precautions: precautions maintained  Taken 9/25/2024 1646 by Nanci Gamboa RN  Isolation Precautions: precautions maintained  Taken 9/25/2024 1415 by Nanci Gamboa RN  Isolation Precautions: precautions maintained     Problem:  Infection (Pneumonia)  Goal: Resolution of Infection Signs and Symptoms  9/26/2024 0305 by Nanci Gamboa RN  Outcome: Progressing  9/25/2024 1743 by Nanci Gamboa RN  Outcome: Progressing  Intervention: Prevent Infection Progression  Recent Flowsheet Documentation  Taken 9/26/2024 0228 by Nanci Gamoba RN  Isolation Precautions: precautions maintained  Taken 9/26/2024 0111 by Nanci Gamboa RN  Isolation Precautions: precautions maintained  Taken 9/25/2024 1807 by Nanci Gamboa RN  Isolation Precautions: precautions maintained  Taken 9/25/2024 1646 by Nanci Gamboa RN  Isolation Precautions: precautions maintained  Taken 9/25/2024 1415 by Nanci Gamboa RN  Isolation Precautions: precautions maintained

## 2024-09-27 ENCOUNTER — READMISSION MANAGEMENT (OUTPATIENT)
Dept: CALL CENTER | Facility: HOSPITAL | Age: 64
End: 2024-09-27
Payer: COMMERCIAL

## 2024-09-27 LAB
BACTERIA SPEC AEROBE CULT: NORMAL
BACTERIA SPEC AEROBE CULT: NORMAL

## 2024-09-30 ENCOUNTER — READMISSION MANAGEMENT (OUTPATIENT)
Dept: CALL CENTER | Facility: HOSPITAL | Age: 64
End: 2024-09-30
Payer: COMMERCIAL

## 2024-09-30 NOTE — OUTREACH NOTE
COPD/PN Week 1 Survey      Flowsheet Row Responses   [facility] patient discharged from? [facility]   Does the patient have one of the following disease processes/diagnoses(primary or secondary)? Pneumonia   Week 1 attempt successful? Yes   Call start time 1701   Call end time 1703   Discharge diagnosis Pneumonia of right lower lobe due to infectious organism   Person spoke with today (if not patient) and relationship Patient   Meds reviewed with patient/caregiver? Yes   Does the patient have all medications ordered at discharge? Yes   Is the patient taking all medications as directed (includes completed medication regime)? Yes   Does the patient have a primary care provider?  Yes   Does the patient have an appointment with their PCP or specialist within 7 days of discharge? No   Comments regarding PCP PCP DR Valente. Patient has not scheduled her PCP appt yet.   Nursing Interventions Educated patient on importance of making appointment, Advised patient to make appointment   Has the patient kept scheduled appointments due by today? N/A   Has home health visited the patient within 72 hours of discharge? N/A   Psychosocial issues? No   Did the patient receive a copy of their discharge instructions? Yes   What is the patient's perception of their health status since discharge? Same  [Patient reports still recovering. Reports that she is doing OK]   If the patient is a current smoker, are they able to teach back resources for cessation? Not a smoker   Is the patient/caregiver able to teach back the hierarchy of who to call/visit for symptoms/problems? PCP, Specialist, Home health nurse, Urgent Care, ED, 911 Yes   Is the patient/caregiver able to teach back importance of completing antibiotic course of treatment? Yes   Week 1 call completed? Yes   Is the patient interested in additional calls from an ambulatory ? No   Would this patient benefit from a Referral to Amb Social Work? No   Wrap up additional  comments Patient denies any needs or questions today.   Call end time 1707            JOSE AQUINO - Registered Nurse

## 2024-10-15 ENCOUNTER — READMISSION MANAGEMENT (OUTPATIENT)
Dept: CALL CENTER | Facility: HOSPITAL | Age: 64
End: 2024-10-15
Payer: COMMERCIAL

## 2024-10-15 NOTE — OUTREACH NOTE
COPD/PN Week 3 Survey      Flowsheet Row Responses   [facility]? [facility]   Does the patient have one of the following disease processes/diagnoses(primary or secondary)? Pneumonia   Week 3 attempt successful? No   Unsuccessful attempts Attempt 1            LAEX Novoa Registered Nurse

## 2025-03-18 ENCOUNTER — TRANSCRIBE ORDERS (OUTPATIENT)
Dept: ADMINISTRATIVE | Facility: HOSPITAL | Age: 65
End: 2025-03-18
Payer: COMMERCIAL

## 2025-03-18 DIAGNOSIS — Z12.31 ENCOUNTER FOR SCREENING MAMMOGRAM FOR MALIGNANT NEOPLASM OF BREAST: Primary | ICD-10-CM

## 2025-03-28 LAB
NCCN CRITERIA FLAG: NORMAL
TYRER CUZICK SCORE: 19.1

## 2025-04-02 ENCOUNTER — HOSPITAL ENCOUNTER (OUTPATIENT)
Dept: MAMMOGRAPHY | Facility: HOSPITAL | Age: 65
Discharge: HOME | End: 2025-04-02
Payer: COMMERCIAL

## 2025-04-02 DIAGNOSIS — Z12.31 ENCOUNTER FOR SCREENING MAMMOGRAM FOR MALIGNANT NEOPLASM OF BREAST: ICD-10-CM

## 2025-04-02 PROCEDURE — 77063 BREAST TOMOSYNTHESIS BI: CPT

## 2025-04-02 PROCEDURE — 77067 SCR MAMMO BI INCL CAD: CPT

## (undated) DEVICE — PAPR PRNT PK SONY W RIBN UPC55

## (undated) DEVICE — PK ENDO GI 50

## (undated) DEVICE — TRAP WIDEEYE POLYP

## (undated) DEVICE — SNAR POLYP CAPTIVATOR HEX 27MM 240CM